# Patient Record
Sex: FEMALE | Race: WHITE | Employment: PART TIME | URBAN - METROPOLITAN AREA
[De-identification: names, ages, dates, MRNs, and addresses within clinical notes are randomized per-mention and may not be internally consistent; named-entity substitution may affect disease eponyms.]

---

## 2017-01-20 ENCOUNTER — OFFICE VISIT (OUTPATIENT)
Dept: FAMILY MEDICINE CLINIC | Age: 37
End: 2017-01-20

## 2017-01-20 VITALS
WEIGHT: 161 LBS | HEIGHT: 71 IN | BODY MASS INDEX: 22.54 KG/M2 | OXYGEN SATURATION: 98 % | DIASTOLIC BLOOD PRESSURE: 64 MMHG | HEART RATE: 74 BPM | SYSTOLIC BLOOD PRESSURE: 104 MMHG

## 2017-01-20 DIAGNOSIS — Z71.2 ENCOUNTER TO DISCUSS TEST RESULTS: Primary | ICD-10-CM

## 2017-01-20 PROCEDURE — 99395 PREV VISIT EST AGE 18-39: CPT | Performed by: FAMILY MEDICINE

## 2017-01-20 ASSESSMENT — ENCOUNTER SYMPTOMS
NAUSEA: 0
COLOR CHANGE: 0
ABDOMINAL PAIN: 0
EYE DISCHARGE: 0
SHORTNESS OF BREATH: 0
EYE REDNESS: 0

## 2017-08-23 ENCOUNTER — TELEPHONE (OUTPATIENT)
Dept: FAMILY MEDICINE CLINIC | Age: 37
End: 2017-08-23

## 2017-08-23 DIAGNOSIS — Z00.00 ANNUAL PHYSICAL EXAM: Primary | ICD-10-CM

## 2017-09-06 RX ORDER — LORATADINE 10 MG/1
10 TABLET ORAL DAILY
Qty: 30 TABLET | Refills: 4 | Status: SHIPPED | OUTPATIENT
Start: 2017-09-06 | End: 2018-07-04 | Stop reason: SDUPTHER

## 2017-11-01 DIAGNOSIS — Z00.00 ANNUAL PHYSICAL EXAM: ICD-10-CM

## 2017-11-01 LAB
A/G RATIO: 1.4 (ref 1.1–2.2)
ALBUMIN SERPL-MCNC: 4.1 G/DL (ref 3.4–5)
ALP BLD-CCNC: 60 U/L (ref 40–129)
ALT SERPL-CCNC: 8 U/L (ref 10–40)
ANION GAP SERPL CALCULATED.3IONS-SCNC: 12 MMOL/L (ref 3–16)
AST SERPL-CCNC: 14 U/L (ref 15–37)
BILIRUB SERPL-MCNC: 0.3 MG/DL (ref 0–1)
BUN BLDV-MCNC: 11 MG/DL (ref 7–20)
CALCIUM SERPL-MCNC: 9.2 MG/DL (ref 8.3–10.6)
CHLORIDE BLD-SCNC: 102 MMOL/L (ref 99–110)
CHOLESTEROL, TOTAL: 197 MG/DL (ref 0–199)
CO2: 26 MMOL/L (ref 21–32)
CREAT SERPL-MCNC: 0.5 MG/DL (ref 0.6–1.1)
GFR AFRICAN AMERICAN: >60
GFR NON-AFRICAN AMERICAN: >60
GLOBULIN: 3 G/DL
GLUCOSE BLD-MCNC: 92 MG/DL (ref 70–99)
HCT VFR BLD CALC: 38.5 % (ref 36–48)
HDLC SERPL-MCNC: 60 MG/DL (ref 40–60)
HEMOGLOBIN: 12.4 G/DL (ref 12–16)
LDL CHOLESTEROL CALCULATED: 125 MG/DL
MCH RBC QN AUTO: 30.9 PG (ref 26–34)
MCHC RBC AUTO-ENTMCNC: 32.2 G/DL (ref 31–36)
MCV RBC AUTO: 96.1 FL (ref 80–100)
PDW BLD-RTO: 12.9 % (ref 12.4–15.4)
PLATELET # BLD: 212 K/UL (ref 135–450)
PMV BLD AUTO: 9 FL (ref 5–10.5)
POTASSIUM SERPL-SCNC: 4.3 MMOL/L (ref 3.5–5.1)
RBC # BLD: 4.01 M/UL (ref 4–5.2)
SODIUM BLD-SCNC: 140 MMOL/L (ref 136–145)
TOTAL PROTEIN: 7.1 G/DL (ref 6.4–8.2)
TRIGL SERPL-MCNC: 60 MG/DL (ref 0–150)
VLDLC SERPL CALC-MCNC: 12 MG/DL
WBC # BLD: 4.8 K/UL (ref 4–11)

## 2017-11-08 ENCOUNTER — OFFICE VISIT (OUTPATIENT)
Dept: FAMILY MEDICINE CLINIC | Age: 37
End: 2017-11-08

## 2017-11-08 VITALS
SYSTOLIC BLOOD PRESSURE: 122 MMHG | BODY MASS INDEX: 22.26 KG/M2 | WEIGHT: 159 LBS | DIASTOLIC BLOOD PRESSURE: 78 MMHG | HEIGHT: 71 IN | OXYGEN SATURATION: 95 % | HEART RATE: 69 BPM

## 2017-11-08 DIAGNOSIS — Z01.419 ENCOUNTER FOR GYNECOLOGICAL EXAMINATION WITHOUT ABNORMAL FINDING: Primary | ICD-10-CM

## 2017-11-08 DIAGNOSIS — Z12.4 SCREENING FOR CERVICAL CANCER: ICD-10-CM

## 2017-11-08 PROCEDURE — 99395 PREV VISIT EST AGE 18-39: CPT | Performed by: FAMILY MEDICINE

## 2017-11-08 ASSESSMENT — ENCOUNTER SYMPTOMS
CONSTIPATION: 0
NAUSEA: 0
EYE REDNESS: 0
VOMITING: 0
SHORTNESS OF BREATH: 0
DIARRHEA: 0

## 2017-11-08 ASSESSMENT — PATIENT HEALTH QUESTIONNAIRE - PHQ9
1. LITTLE INTEREST OR PLEASURE IN DOING THINGS: 0
SUM OF ALL RESPONSES TO PHQ QUESTIONS 1-9: 0
SUM OF ALL RESPONSES TO PHQ9 QUESTIONS 1 & 2: 0
2. FEELING DOWN, DEPRESSED OR HOPELESS: 0

## 2017-11-08 NOTE — PROGRESS NOTES
Subjective:      Patient ID: Niko Lawrence is a 40 y.o. female. Here for pap no complaints  She does have a history of abnormal Pap smear. She has had a procedure for it. She usually gets Paps once a year. She had her lab drawn before today's visit here is the results  Notes Recorded by Cameron Mcdonough DO on 11/2/2017 at 7:56 PM EDT  Blood count good. Electrolytes, calcium, protein,  blood sugar, liver, and kidney function good, cholesterol is good.    Past Medical History:   Diagnosis Date    Cervical dysplasia     Endometriosis      Past Surgical History:   Procedure Laterality Date    LAPAROSCOPY      LEEP       Social History     Social History    Marital status:      Spouse name: N/A    Number of children: 1    Years of education: N/A     Occupational History    [employer]      special education     Social History Main Topics    Smoking status: Never Smoker    Smokeless tobacco: Never Used    Alcohol use Yes      Comment: Socially (beer)occasional    Drug use: No    Sexual activity: Not on file     Other Topics Concern    Not on file     Social History Narrative    No narrative on file       HPI    Review of Systems   Constitutional: Negative for unexpected weight change. HENT: Negative. Eyes: Negative for redness. Respiratory: Negative for shortness of breath. Cardiovascular: Negative for chest pain and leg swelling. Gastrointestinal: Negative for constipation, diarrhea, nausea and vomiting. Genitourinary: Negative for genital sores, menstrual problem, pelvic pain, vaginal bleeding, vaginal discharge and vaginal pain. Skin: Negative for pallor. Allergic/Immunologic: Negative for immunocompromised state. Psychiatric/Behavioral: Negative for confusion. All other systems reviewed and are negative. Objective:   Physical Exam   Constitutional: She is oriented to person, place, and time. She appears well-developed and well-nourished. No distress.    HENT:   Head: Normocephalic and atraumatic. Eyes: Conjunctivae are normal. No scleral icterus. Cardiovascular: Normal rate. Pulmonary/Chest: Effort normal. No stridor. Genitourinary: Vagina normal and uterus normal. No vaginal discharge found. Genitourinary Comments: Labia: no lesions  Vagina: no discharge or lesions  Cervix: nonfriable, no discharge, the cervix was  tilted with the cervix and uterus to the right and inserting the brush into the endocervical canal was difficult. Uterus and adnexa: no cervical motion tenderness, no adnexal fullness or tenderness  Rectum: normal     Musculoskeletal: She exhibits no edema. Neurological: She is alert and oriented to person, place, and time. Skin: Skin is warm. She is not diaphoretic. No erythema. Psychiatric: She has a normal mood and affect. Her behavior is normal. Judgment and thought content normal.       Assessment:      1. Encounter for gynecological examination without abnormal finding     2.  Screening for cervical cancer  PAP SMEAR           Plan:      Vashti was seen today for gynecologic exam.    Diagnoses and all orders for this visit:    Encounter for gynecological examination without abnormal finding  With a history of abnormal Pap smear  Screening for cervical cancer  -     PAP SMEAR   discussed labs, see results

## 2017-11-10 LAB
HPV COMMENT: NORMAL
HPV TYPE 16: NOT DETECTED
HPV TYPE 18: NOT DETECTED
HPVOH (OTHER TYPES): NOT DETECTED

## 2017-11-13 RX ORDER — FLUCONAZOLE 150 MG/1
150 TABLET ORAL ONCE
Qty: 1 TABLET | Refills: 0 | Status: SHIPPED | OUTPATIENT
Start: 2017-11-13 | End: 2017-11-13

## 2018-02-01 ENCOUNTER — PATIENT MESSAGE (OUTPATIENT)
Dept: FAMILY MEDICINE CLINIC | Age: 38
End: 2018-02-01

## 2018-02-01 RX ORDER — FLUTICASONE PROPIONATE 50 MCG
1 SPRAY, SUSPENSION (ML) NASAL DAILY
Qty: 1 BOTTLE | Refills: 3 | Status: SHIPPED | OUTPATIENT
Start: 2018-02-01 | End: 2019-06-14 | Stop reason: SDUPTHER

## 2018-07-05 RX ORDER — LORATADINE 10 MG/1
TABLET ORAL
Qty: 30 TABLET | Refills: 3 | Status: SHIPPED | OUTPATIENT
Start: 2018-07-05 | End: 2018-12-23 | Stop reason: SDUPTHER

## 2018-08-24 ENCOUNTER — E-VISIT (OUTPATIENT)
Dept: FAMILY MEDICINE CLINIC | Age: 38
End: 2018-08-24

## 2018-08-24 DIAGNOSIS — J01.80 OTHER ACUTE SINUSITIS, RECURRENCE NOT SPECIFIED: Primary | ICD-10-CM

## 2018-08-24 PROCEDURE — 99444 PR PHYSICIAN ONLINE EVALUATION & MANAGEMENT SERVICE: CPT | Performed by: FAMILY MEDICINE

## 2018-08-24 RX ORDER — SULFAMETHOXAZOLE AND TRIMETHOPRIM 800; 160 MG/1; MG/1
1 TABLET ORAL 2 TIMES DAILY
Qty: 20 TABLET | Refills: 0 | Status: SHIPPED | OUTPATIENT
Start: 2018-08-24 | End: 2018-09-03

## 2018-08-24 ASSESSMENT — LIFESTYLE VARIABLES: SMOKING_STATUS: NO

## 2018-12-26 RX ORDER — LORATADINE 10 MG/1
TABLET ORAL
Qty: 30 TABLET | Refills: 2 | Status: SHIPPED | OUTPATIENT
Start: 2018-12-26 | End: 2019-09-11 | Stop reason: ALTCHOICE

## 2019-01-14 ENCOUNTER — OFFICE VISIT (OUTPATIENT)
Dept: FAMILY MEDICINE CLINIC | Age: 39
End: 2019-01-14
Payer: OTHER GOVERNMENT

## 2019-01-14 VITALS
BODY MASS INDEX: 23.1 KG/M2 | OXYGEN SATURATION: 98 % | HEIGHT: 71 IN | TEMPERATURE: 98.5 F | RESPIRATION RATE: 16 BRPM | WEIGHT: 165 LBS | DIASTOLIC BLOOD PRESSURE: 64 MMHG | SYSTOLIC BLOOD PRESSURE: 90 MMHG | HEART RATE: 77 BPM

## 2019-01-14 DIAGNOSIS — J01.21 ACUTE RECURRENT ETHMOIDAL SINUSITIS: Primary | ICD-10-CM

## 2019-01-14 PROCEDURE — 99213 OFFICE O/P EST LOW 20 MIN: CPT | Performed by: NURSE PRACTITIONER

## 2019-01-14 RX ORDER — AZITHROMYCIN 250 MG/1
250 TABLET, FILM COATED ORAL SEE ADMIN INSTRUCTIONS
Qty: 6 TABLET | Refills: 0 | Status: SHIPPED | OUTPATIENT
Start: 2019-01-14 | End: 2019-01-19

## 2019-01-14 RX ORDER — HUMIDIFIER
1 EACH MISCELLANEOUS NIGHTLY
Qty: 1 EACH | Refills: 0 | Status: SHIPPED | OUTPATIENT
Start: 2019-01-14

## 2019-01-14 RX ORDER — CETIRIZINE HYDROCHLORIDE 10 MG/1
10 TABLET ORAL DAILY
Qty: 90 TABLET | Refills: 1 | Status: SHIPPED | OUTPATIENT
Start: 2019-01-14 | End: 2019-09-28 | Stop reason: SDUPTHER

## 2019-01-14 ASSESSMENT — PATIENT HEALTH QUESTIONNAIRE - PHQ9
SUM OF ALL RESPONSES TO PHQ QUESTIONS 1-9: 0
2. FEELING DOWN, DEPRESSED OR HOPELESS: 0
SUM OF ALL RESPONSES TO PHQ QUESTIONS 1-9: 0
1. LITTLE INTEREST OR PLEASURE IN DOING THINGS: 0
SUM OF ALL RESPONSES TO PHQ9 QUESTIONS 1 & 2: 0

## 2019-01-14 ASSESSMENT — ENCOUNTER SYMPTOMS
COUGH: 0
SINUS PAIN: 1
RESPIRATORY NEGATIVE: 1
RHINORRHEA: 1
SINUS PRESSURE: 1
HOARSE VOICE: 1

## 2019-06-14 RX ORDER — FLUTICASONE PROPIONATE 50 MCG
SPRAY, SUSPENSION (ML) NASAL
Qty: 1 BOTTLE | Refills: 2 | Status: SHIPPED | OUTPATIENT
Start: 2019-06-14

## 2019-09-11 ENCOUNTER — OFFICE VISIT (OUTPATIENT)
Dept: FAMILY MEDICINE CLINIC | Age: 39
End: 2019-09-11
Payer: OTHER GOVERNMENT

## 2019-09-11 VITALS
WEIGHT: 153 LBS | SYSTOLIC BLOOD PRESSURE: 94 MMHG | DIASTOLIC BLOOD PRESSURE: 72 MMHG | OXYGEN SATURATION: 98 % | BODY MASS INDEX: 21.42 KG/M2 | HEART RATE: 63 BPM | HEIGHT: 71 IN

## 2019-09-11 DIAGNOSIS — R53.83 TIREDNESS: ICD-10-CM

## 2019-09-11 DIAGNOSIS — Z83.49 FAMILY HISTORY OF THYROID DISEASE: ICD-10-CM

## 2019-09-11 DIAGNOSIS — Z87.42 HISTORY OF ENDOMETRIOSIS: ICD-10-CM

## 2019-09-11 DIAGNOSIS — Z00.00 ANNUAL PHYSICAL EXAM: Primary | ICD-10-CM

## 2019-09-11 DIAGNOSIS — Z00.00 ANNUAL PHYSICAL EXAM: ICD-10-CM

## 2019-09-11 DIAGNOSIS — N92.1 MENORRHAGIA WITH IRREGULAR CYCLE: ICD-10-CM

## 2019-09-11 LAB
A/G RATIO: 1.7 (ref 1.1–2.2)
ALBUMIN SERPL-MCNC: 4.7 G/DL (ref 3.4–5)
ALP BLD-CCNC: 61 U/L (ref 40–129)
ALT SERPL-CCNC: 7 U/L (ref 10–40)
ANION GAP SERPL CALCULATED.3IONS-SCNC: 12 MMOL/L (ref 3–16)
AST SERPL-CCNC: 12 U/L (ref 15–37)
BILIRUB SERPL-MCNC: 0.4 MG/DL (ref 0–1)
BUN BLDV-MCNC: 11 MG/DL (ref 7–20)
CALCIUM SERPL-MCNC: 9.3 MG/DL (ref 8.3–10.6)
CHLORIDE BLD-SCNC: 105 MMOL/L (ref 99–110)
CHOLESTEROL, TOTAL: 198 MG/DL (ref 0–199)
CO2: 26 MMOL/L (ref 21–32)
CREAT SERPL-MCNC: 0.6 MG/DL (ref 0.6–1.1)
GFR AFRICAN AMERICAN: >60
GFR NON-AFRICAN AMERICAN: >60
GLOBULIN: 2.8 G/DL
GLUCOSE BLD-MCNC: 98 MG/DL (ref 70–99)
HCT VFR BLD CALC: 38.7 % (ref 36–48)
HDLC SERPL-MCNC: 59 MG/DL (ref 40–60)
HEMOGLOBIN: 12.9 G/DL (ref 12–16)
LDL CHOLESTEROL CALCULATED: 124 MG/DL
MCH RBC QN AUTO: 31.7 PG (ref 26–34)
MCHC RBC AUTO-ENTMCNC: 33.4 G/DL (ref 31–36)
MCV RBC AUTO: 94.8 FL (ref 80–100)
PDW BLD-RTO: 13 % (ref 12.4–15.4)
PLATELET # BLD: 234 K/UL (ref 135–450)
PMV BLD AUTO: 9.1 FL (ref 5–10.5)
POTASSIUM SERPL-SCNC: 4.6 MMOL/L (ref 3.5–5.1)
RBC # BLD: 4.08 M/UL (ref 4–5.2)
SODIUM BLD-SCNC: 143 MMOL/L (ref 136–145)
TOTAL PROTEIN: 7.5 G/DL (ref 6.4–8.2)
TRIGL SERPL-MCNC: 77 MG/DL (ref 0–150)
TSH REFLEX FT4: 1.12 UIU/ML (ref 0.27–4.2)
VLDLC SERPL CALC-MCNC: 15 MG/DL
WBC # BLD: 4 K/UL (ref 4–11)

## 2019-09-11 PROCEDURE — 99395 PREV VISIT EST AGE 18-39: CPT | Performed by: FAMILY MEDICINE

## 2019-09-11 PROCEDURE — 90686 IIV4 VACC NO PRSV 0.5 ML IM: CPT | Performed by: FAMILY MEDICINE

## 2019-09-11 PROCEDURE — 90471 IMMUNIZATION ADMIN: CPT | Performed by: FAMILY MEDICINE

## 2019-09-11 ASSESSMENT — ENCOUNTER SYMPTOMS
NAUSEA: 0
ABDOMINAL PAIN: 0
COLOR CHANGE: 0
SHORTNESS OF BREATH: 0
EYE REDNESS: 0
EYE DISCHARGE: 0

## 2019-09-11 NOTE — PROGRESS NOTES
kg).    Physical Exam   Constitutional: She is oriented to person, place, and time. She appears well-developed and well-nourished. No distress. HENT:   Head: Normocephalic and atraumatic. Right Ear: External ear normal.   Left Ear: External ear normal.   Nose: Nose normal.   Mouth/Throat: Oropharynx is clear and moist.   Eyes: Pupils are equal, round, and reactive to light. Conjunctivae and EOM are normal. No scleral icterus. Neck: Normal range of motion. Neck supple. No thyromegaly present. Cardiovascular: Normal rate, regular rhythm and normal heart sounds. Pulmonary/Chest: Effort normal and breath sounds normal. No respiratory distress. She has no wheezes. Abdominal: Soft. Bowel sounds are normal. There is no tenderness. Musculoskeletal: Normal range of motion. She exhibits no edema or tenderness. Lymphadenopathy:     She has no cervical adenopathy. Neurological: She is alert and oriented to person, place, and time. Skin: Skin is warm and dry. No erythema. Psychiatric: She has a normal mood and affect. Her behavior is normal. Judgment and thought content normal.               The ASCVD Risk score (Luba Lewis, et al., 2013) failed to calculate for the following reasons:     The 2013 ASCVD risk score is only valid for ages 36 to 78    Immunization History   Administered Date(s) Administered    Influenza Vaccine, unspecified formulation 11/01/2012, 02/17/2018    Influenza Virus Vaccine 11/01/2012, 10/21/2014, 10/27/2015    Influenza, MDCK Quadv, IM, PF (Flucelvax 4 yrs and older) 02/17/2018    Influenza, Quadv, IM, PF (6 mo and older Fluzone, Flulaval, Fluarix, and 3 yrs and older Afluria) 11/01/2016    PPD Test 09/22/2014       Health Maintenance   Topic Date Due    Varicella Vaccine (1 of 2 - 13+ 2-dose series) 02/12/1993    HIV screen  02/12/1995    DTaP/Tdap/Td vaccine (1 - Tdap) 02/12/1999    Flu vaccine (1) 09/01/2019    Cervical cancer screen  11/08/2022    Pneumococcal 0-64

## 2019-10-01 RX ORDER — CETIRIZINE HYDROCHLORIDE 10 MG/1
10 TABLET ORAL DAILY
Qty: 90 TABLET | Refills: 1 | Status: SHIPPED | OUTPATIENT
Start: 2019-10-01 | End: 2020-12-23 | Stop reason: SDUPTHER

## 2019-10-01 RX ORDER — CETIRIZINE HYDROCHLORIDE 10 MG/1
TABLET ORAL
Qty: 90 TABLET | Refills: 0 | Status: SHIPPED | OUTPATIENT
Start: 2019-10-01 | End: 2019-10-01 | Stop reason: SDUPTHER

## 2019-10-30 ENCOUNTER — OFFICE VISIT (OUTPATIENT)
Dept: OBGYN CLINIC | Age: 39
End: 2019-10-30
Payer: OTHER GOVERNMENT

## 2019-10-30 VITALS
WEIGHT: 155.8 LBS | TEMPERATURE: 98.6 F | BODY MASS INDEX: 21.73 KG/M2 | HEART RATE: 63 BPM | DIASTOLIC BLOOD PRESSURE: 62 MMHG | SYSTOLIC BLOOD PRESSURE: 98 MMHG

## 2019-10-30 DIAGNOSIS — N93.9 ABNORMAL UTERINE BLEEDING (AUB): Primary | ICD-10-CM

## 2019-10-30 DIAGNOSIS — N93.9 ABNORMAL UTERINE BLEEDING: Primary | ICD-10-CM

## 2019-10-30 DIAGNOSIS — Z32.02 URINE PREGNANCY TEST NEGATIVE: ICD-10-CM

## 2019-10-30 DIAGNOSIS — N89.8 LEUKORRHEA: ICD-10-CM

## 2019-10-30 DIAGNOSIS — Z12.4 PAP SMEAR FOR CERVICAL CANCER SCREENING: ICD-10-CM

## 2019-10-30 LAB
BASOPHILS ABSOLUTE: 0 K/UL (ref 0–0.2)
BASOPHILS RELATIVE PERCENT: 1.1 %
CONTROL: NORMAL
EOSINOPHILS ABSOLUTE: 0.1 K/UL (ref 0–0.6)
EOSINOPHILS RELATIVE PERCENT: 3 %
HCT VFR BLD CALC: 40.4 % (ref 36–48)
HEMOGLOBIN: 13.6 G/DL (ref 12–16)
LYMPHOCYTES ABSOLUTE: 1.6 K/UL (ref 1–5.1)
LYMPHOCYTES RELATIVE PERCENT: 37.4 %
MCH RBC QN AUTO: 31.6 PG (ref 26–34)
MCHC RBC AUTO-ENTMCNC: 33.6 G/DL (ref 31–36)
MCV RBC AUTO: 94 FL (ref 80–100)
MONOCYTES ABSOLUTE: 0.4 K/UL (ref 0–1.3)
MONOCYTES RELATIVE PERCENT: 8.7 %
NEUTROPHILS ABSOLUTE: 2.1 K/UL (ref 1.7–7.7)
NEUTROPHILS RELATIVE PERCENT: 49.8 %
PDW BLD-RTO: 12.8 % (ref 12.4–15.4)
PLATELET # BLD: 233 K/UL (ref 135–450)
PMV BLD AUTO: 9.2 FL (ref 5–10.5)
PREGNANCY TEST URINE, POC: NEGATIVE
RBC # BLD: 4.29 M/UL (ref 4–5.2)
WBC # BLD: 4.2 K/UL (ref 4–11)

## 2019-10-30 PROCEDURE — 99203 OFFICE O/P NEW LOW 30 MIN: CPT | Performed by: OBSTETRICS & GYNECOLOGY

## 2019-10-30 PROCEDURE — 58100 BIOPSY OF UTERUS LINING: CPT | Performed by: OBSTETRICS & GYNECOLOGY

## 2019-10-30 PROCEDURE — 76856 US EXAM PELVIC COMPLETE: CPT | Performed by: OBSTETRICS & GYNECOLOGY

## 2019-10-30 PROCEDURE — 81025 URINE PREGNANCY TEST: CPT | Performed by: OBSTETRICS & GYNECOLOGY

## 2019-10-30 PROCEDURE — 36415 COLL VENOUS BLD VENIPUNCTURE: CPT | Performed by: OBSTETRICS & GYNECOLOGY

## 2019-10-31 LAB
CANDIDA SPECIES, DNA PROBE: NORMAL
FOLLICLE STIMULATING HORMONE: 3.8 MIU/ML
GARDNERELLA VAGINALIS, DNA PROBE: NORMAL
PROLACTIN: 9.9 NG/ML
TRICHOMONAS VAGINALIS DNA: NORMAL
TSH REFLEX: 1.55 UIU/ML (ref 0.27–4.2)

## 2019-11-04 ASSESSMENT — ENCOUNTER SYMPTOMS
SHORTNESS OF BREATH: 0
COUGH: 0
ABDOMINAL PAIN: 0
NAUSEA: 0
VOMITING: 0
DIARRHEA: 0
CONSTIPATION: 0
SORE THROAT: 0

## 2020-06-22 ENCOUNTER — E-VISIT (OUTPATIENT)
Dept: FAMILY MEDICINE CLINIC | Age: 40
End: 2020-06-22
Payer: OTHER GOVERNMENT

## 2020-06-22 PROCEDURE — 99421 OL DIG E/M SVC 5-10 MIN: CPT | Performed by: FAMILY MEDICINE

## 2020-10-27 ENCOUNTER — OFFICE VISIT (OUTPATIENT)
Dept: PRIMARY CARE CLINIC | Age: 40
End: 2020-10-27
Payer: OTHER GOVERNMENT

## 2020-10-27 DIAGNOSIS — Z11.59 SCREENING FOR VIRAL DISEASE: Primary | ICD-10-CM

## 2020-10-27 PROCEDURE — 99211 OFF/OP EST MAY X REQ PHY/QHP: CPT | Performed by: FAMILY MEDICINE

## 2020-10-27 NOTE — PATIENT INSTRUCTIONS

## 2020-10-27 NOTE — PROGRESS NOTES
Vashti Tierney received a viral test for COVID-19. They were educated on isolation and quarantine as appropriate. For any symptoms, they were directed to seek care from their PCP, given contact information to establish with a doctor, directed to an urgent care or the emergency room.

## 2020-10-29 LAB — SARS-COV-2, NAA: NOT DETECTED

## 2020-10-30 NOTE — RESULT ENCOUNTER NOTE

## 2020-12-23 RX ORDER — CETIRIZINE HYDROCHLORIDE 10 MG/1
10 TABLET ORAL DAILY
Qty: 90 TABLET | Refills: 3 | Status: SHIPPED | OUTPATIENT
Start: 2020-12-23 | End: 2022-01-14

## 2021-03-07 ENCOUNTER — NURSE TRIAGE (OUTPATIENT)
Dept: OTHER | Facility: CLINIC | Age: 41
End: 2021-03-07

## 2021-03-07 NOTE — TELEPHONE ENCOUNTER
Call received on [address]. Brief description of triage: Calls requesting a covid test.  Discussed and triaged symptoms of dry cough, body aches, headache, fatigue, runny nose starting 2 days ago. Triage indicates for patient to PCP in 24 hours. Instructed to call back tomorrow to PCP to request a virtual visit on Monday. Given the number for [facility] covid testing for [facility] patients [phone] to call and see if she qualifies for testing and/or provider orders. Care advice provided. Recommended using local department of health website for testing locations and up to date information. Caller verbalizes understanding, denies any other questions or concerns; instructed to call back for any new or worsening symptoms. Reason for Disposition   [1] Continuous (nonstop) coughing interferes with work or school AND [2] no improvement using cough treatment per protocol    Answer Assessment - Initial Assessment Questions  1. COVID-19 DIAGNOSIS: \"Who made your Coronavirus (COVID-19) diagnosis? \" \"Was it confirmed by a positive lab test?\" If not diagnosed by a HCP, ask \"Are there lots of cases (community spread) where you live? \" (See public health department website, if unsure)      No exposure    2. COVID-19 EXPOSURE: \"Was there any known exposure to COVID before the symptoms began? \" CDC Definition of close contact: within 6 feet (2 meters) for a total of 15 minutes or more over a 24-hour period. See above    3. ONSET: \"When did the COVID-19 symptoms start? \"       2 days ago    4. WORST SYMPTOM: \"What is your worst symptom? \" (e.g., cough, fever, shortness of breath, muscle aches)      Dry cough    5. COUGH: \"Do you have a cough? \" If so, ask: \"How bad is the cough? \"        Dry cough, interferes with sleep    6. FEVER: \"Do you have a fever? \" If so, ask: \"What is your temperature, how was it measured, and when did it start? \"      Feels feverish    7. RESPIRATORY STATUS: \"Describe your breathing? \" (e.g., shortness of breath, wheezing, unable to speak)       Denies    8. BETTER-SAME-WORSE: Norlene Pilar you getting better, staying the same or getting worse compared to yesterday? \"  If getting worse, ask, \"In what way? \"      Worse    9. HIGH RISK DISEASE: \"Do you have any chronic medical problems? \" (e.g., asthma, heart or lung disease, weak immune system, obesity, etc.)      Denies    10. PREGNANCY: \"Is there any chance you are pregnant? \" \"When was your last menstrual period? \"        Denies    11. OTHER SYMPTOMS: \"Do you have any other symptoms? \"  (e.g., chills, fatigue, headache, loss of smell or taste, muscle pain, sore throat; new loss of smell or taste especially support the diagnosis of COVID-19)        See travel screen. Denies chest pain or dizziness.     Protocols used: CORONAVIRUS (COVID-19) DIAGNOSED OR SUSPECTED-ADULT-

## 2021-03-08 ENCOUNTER — OFFICE VISIT (OUTPATIENT)
Dept: PRIMARY CARE CLINIC | Age: 41
End: 2021-03-08
Payer: OTHER GOVERNMENT

## 2021-03-08 DIAGNOSIS — Z11.59 SCREENING FOR VIRAL DISEASE: Primary | ICD-10-CM

## 2021-03-08 PROCEDURE — 99211 OFF/OP EST MAY X REQ PHY/QHP: CPT | Performed by: NURSE PRACTITIONER

## 2021-03-08 NOTE — PATIENT INSTRUCTIONS
Advance Care Planning  People with COVID-19 may have no symptoms, mild symptoms, such as fever, cough, and shortness of breath or they may have more severe illness, developing severe and fatal pneumonia. As a result, Advance Care Planning with attention to naming a health care decision maker (someone you trust to make healthcare decisions for you if you could not speak for yourself) and sharing other health care preferences is important BEFORE a possible health crisis. Please contact your Primary Care Provider to discuss Advance Care Planning. Preventing the Spread of Coronavirus Disease 2019 in Homes and Residential Communities  For the most recent information go to [email or web address]    Prevention steps for People with confirmed or suspected COVID-19 (including persons under investigation) who do not need to be hospitalized  and   People with confirmed COVID-19 who were hospitalized and determined to be medically stable to go home    Your healthcare provider and public health staff will evaluate whether you can be cared for at home. If it is determined that you do not need to be hospitalized and can be isolated at home, you will be monitored by staff from your local or state health department. You should follow the prevention steps below until a healthcare provider or local or state health department says you can return to your normal activities. Stay home except to get medical care  People who are mildly ill with COVID-19 are able to isolate at home during their illness. You should restrict activities outside your home, except for getting medical care. Do not go to work, school, or public areas. Avoid using public transportation, ride-sharing, or taxis. Separate yourself from other people and animals in your home  People: As much as possible, you should stay in a specific room and away from other people in your home.  Also, you should use a separate bathroom, if available. Animals: You should restrict contact with pets and other animals while you are sick with COVID-19, just like you would around other people. Although there have not been reports of pets or other animals becoming sick with COVID-19, it is still recommended that people sick with COVID-19 limit contact with animals until more information is known about the virus. When possible, have another member of your household care for your animals while you are sick. If you are sick with COVID-19, avoid contact with your pet, including petting, snuggling, being kissed or licked, and sharing food. If you must care for your pet or be around animals while you are sick, wash your hands before and after you interact with pets and wear a facemask. Call ahead before visiting your doctor  If you have a medical appointment, call the healthcare provider and tell them that you have or may have COVID-19. This will help the healthcare providers office take steps to keep other people from getting infected or exposed. Wear a facemask  You should wear a facemask when you are around other people (e.g., sharing a room or vehicle) or pets and before you enter a healthcare providers office. If you are not able to wear a facemask (for example, because it causes trouble breathing), then people who live with you should not stay in the same room with you, or they should wear a facemask if they enter your room. Cover your coughs and sneezes  Cover your mouth and nose with a tissue when you cough or sneeze. Throw used tissues in a lined trash can. Immediately wash your hands with soap and water for at least 20 seconds or, if soap and water are not available, clean your hands with an alcohol-based hand  that contains at least 60% alcohol.   Clean your hands often  Wash your hands often with soap and water for at least 20 seconds, especially after blowing your nose, coughing, or sneezing; going to the bathroom; and have a medical emergency and need to call 911, notify the dispatch personnel that you have, or are being evaluated for COVID-19. If possible, put on a facemask before emergency medical services arrive. Discontinuing home isolation  Patients with confirmed COVID-19 should remain under home isolation precautions until the risk of secondary transmission to others is thought to be low. The decision to discontinue home isolation precautions should be made on a case-by-case basis, in consultation with healthcare providers and state and local health departments.

## 2021-03-09 LAB — SARS-COV-2: NOT DETECTED

## 2021-08-20 ENCOUNTER — NURSE TRIAGE (OUTPATIENT)
Dept: OTHER | Facility: CLINIC | Age: 41
End: 2021-08-20

## 2021-08-20 NOTE — TELEPHONE ENCOUNTER
Received call from maude at [facility] with Red Flag Complaint. Brief description of triage: depression she has tried counseling and self care now ready for medication noted anxiety at times that is new for her     Triage indicates for patient to be seen in the next three days will attempt to get her in today     Care advice provided, patient verbalizes understanding; denies any other questions or concerns; instructed to call back for any new or worsening symptoms. Writer provided warm transfer to rain at [facility] for appointment scheduling. Attention Provider: Thank you for allowing me to participate in the care of your patient. The patient was connected to triage in response to information provided to the [facility]. Please do not respond through this encounter as the response is not directed to a shared pool. Reason for Disposition   Symptoms interfere with work or school    Answer Assessment - Initial Assessment Questions  1. CONCERN: \"What happened that made you call today? \"      Wanted to schedule annual physical with MD she wants to talk about depression     2. DEPRESSION SYMPTOM SCREENING: \"How are you feeling overall? \" (e.g., decreased energy, increased sleeping or difficulty sleeping, difficulty concentrating, feelings of sadness, guilt, hopelessness, or worthlessness)      Decreased energy sleeping a lot and does not feel like doing anything and it is hard to keep her happy     3. RISK OF HARM - SUICIDAL IDEATION:  \"Do you ever have thoughts of hurting or killing yourself? \"  (e.g., yes, no, no but preoccupation with thoughts about death)    - INTENT:  \"Do you have thoughts of hurting or killing yourself right NOW? \" (e.g., yes, no, N/A)    - PLAN: \"Do you have a specific plan for how you would do this? \" (e.g., gun, knife, overdose, no plan, N/A)      None     4. RISK OF HARM - HOMICIDAL IDEATION:  \"Do you ever have thoughts of hurting or killing someone else? \"  (e.g.,

## 2021-08-23 ENCOUNTER — OFFICE VISIT (OUTPATIENT)
Dept: FAMILY MEDICINE CLINIC | Age: 41
End: 2021-08-23
Payer: OTHER GOVERNMENT

## 2021-08-23 VITALS
HEART RATE: 70 BPM | RESPIRATION RATE: 16 BRPM | HEIGHT: 71 IN | BODY MASS INDEX: 21.98 KG/M2 | SYSTOLIC BLOOD PRESSURE: 110 MMHG | WEIGHT: 157 LBS | DIASTOLIC BLOOD PRESSURE: 70 MMHG | OXYGEN SATURATION: 97 % | TEMPERATURE: 97.8 F

## 2021-08-23 DIAGNOSIS — F32.A ANXIETY AND DEPRESSION: Primary | ICD-10-CM

## 2021-08-23 DIAGNOSIS — F41.9 ANXIETY AND DEPRESSION: Primary | ICD-10-CM

## 2021-08-23 DIAGNOSIS — Z13.31 POSITIVE DEPRESSION SCREENING: ICD-10-CM

## 2021-08-23 PROCEDURE — 99214 OFFICE O/P EST MOD 30 MIN: CPT | Performed by: NURSE PRACTITIONER

## 2021-08-23 PROCEDURE — G8431 POS CLIN DEPRES SCRN F/U DOC: HCPCS | Performed by: NURSE PRACTITIONER

## 2021-08-23 RX ORDER — PAROXETINE 10 MG/1
10 TABLET, FILM COATED ORAL DAILY
Qty: 30 TABLET | Refills: 3 | Status: SHIPPED | OUTPATIENT
Start: 2021-08-23 | End: 2022-06-17

## 2021-08-23 SDOH — ECONOMIC STABILITY: FOOD INSECURITY: WITHIN THE PAST 12 MONTHS, THE FOOD YOU BOUGHT JUST DIDN'T LAST AND YOU DIDN'T HAVE MONEY TO GET MORE.: NEVER TRUE

## 2021-08-23 SDOH — ECONOMIC STABILITY: FOOD INSECURITY: WITHIN THE PAST 12 MONTHS, YOU WORRIED THAT YOUR FOOD WOULD RUN OUT BEFORE YOU GOT MONEY TO BUY MORE.: NEVER TRUE

## 2021-08-23 ASSESSMENT — ENCOUNTER SYMPTOMS
RESPIRATORY NEGATIVE: 1
GASTROINTESTINAL NEGATIVE: 1

## 2021-08-23 ASSESSMENT — PATIENT HEALTH QUESTIONNAIRE - PHQ9
SUM OF ALL RESPONSES TO PHQ QUESTIONS 1-9: 10
6. FEELING BAD ABOUT YOURSELF - OR THAT YOU ARE A FAILURE OR HAVE LET YOURSELF OR YOUR FAMILY DOWN: 1
1. LITTLE INTEREST OR PLEASURE IN DOING THINGS: 3
9. THOUGHTS THAT YOU WOULD BE BETTER OFF DEAD, OR OF HURTING YOURSELF: 0
SUM OF ALL RESPONSES TO PHQ QUESTIONS 1-9: 10
3. TROUBLE FALLING OR STAYING ASLEEP: 2
5. POOR APPETITE OR OVEREATING: 0
SUM OF ALL RESPONSES TO PHQ9 QUESTIONS 1 & 2: 5
2. FEELING DOWN, DEPRESSED OR HOPELESS: 2
8. MOVING OR SPEAKING SO SLOWLY THAT OTHER PEOPLE COULD HAVE NOTICED. OR THE OPPOSITE, BEING SO FIGETY OR RESTLESS THAT YOU HAVE BEEN MOVING AROUND A LOT MORE THAN USUAL: 0
7. TROUBLE CONCENTRATING ON THINGS, SUCH AS READING THE NEWSPAPER OR WATCHING TELEVISION: 0
SUM OF ALL RESPONSES TO PHQ QUESTIONS 1-9: 10
10. IF YOU CHECKED OFF ANY PROBLEMS, HOW DIFFICULT HAVE THESE PROBLEMS MADE IT FOR YOU TO DO YOUR WORK, TAKE CARE OF THINGS AT HOME, OR GET ALONG WITH OTHER PEOPLE: 1
4. FEELING TIRED OR HAVING LITTLE ENERGY: 2

## 2021-08-23 ASSESSMENT — COLUMBIA-SUICIDE SEVERITY RATING SCALE - C-SSRS
6. HAVE YOU EVER DONE ANYTHING, STARTED TO DO ANYTHING, OR PREPARED TO DO ANYTHING TO END YOUR LIFE?: NO
1. WITHIN THE PAST MONTH, HAVE YOU WISHED YOU WERE DEAD OR WISHED YOU COULD GO TO SLEEP AND NOT WAKE UP?: NO
2. HAVE YOU ACTUALLY HAD ANY THOUGHTS OF KILLING YOURSELF?: NO

## 2021-08-23 ASSESSMENT — SOCIAL DETERMINANTS OF HEALTH (SDOH): HOW HARD IS IT FOR YOU TO PAY FOR THE VERY BASICS LIKE FOOD, HOUSING, MEDICAL CARE, AND HEATING?: NOT HARD AT ALL

## 2021-08-23 NOTE — PATIENT INSTRUCTIONS
Patient Education        paroxetine  Pronunciation:  patience ROSAS a teen  Brand:  Brisdelle, Paxil, Paxil CR, Ashokitzel Miguelgi  What is the most important information I should know about paroxetine? You should not use paroxetine if you are also taking pimozide or thioridazine. Do not use paroxetine within 14 days before or 14 days after you have used an MAO inhibitor, such as isocarboxazid, linezolid, methylene blue injection, phenelzine, rasagiline, selegiline, or tranylcypromine. Some young people have thoughts about suicide when first taking an antidepressant. Stay alert to changes in your mood or symptoms. Report any new or worsening symptoms to your doctor  Seek medical attention right away if you have symptoms such as: agitation, hallucinations, muscle stiffness, twitching, loss of coordination, dizziness, warmth or tingly feeling, nausea, vomiting, diarrhea, fever, sweating, tremors, racing heartbeats, or a seizure (convulsions). What is paroxetine? Paroxetine is a selective serotonin reuptake inhibitor (SSRI) antidepressant. Paroxetine is used to treat depression, including major depressive disorder. Paroxtine is also used to treat panic disorder, obsessive-compulsive disorder (OCD), anxiety disorders, post-traumatic stress disorder (PTSD), and premenstrual dysphoric disorder (PMDD). The Brisdelle brand of paroxetine is used to treat hot flashes related to menopause. Tatianaa Aleman is not for treating any other conditions. Paroxetine may also be used for purposes not listed in this medication guide. What should I discuss with my healthcare provider before taking paroxetine? You should not use this medicine if you are allergic to paroxetine, or if you are also taking pimozide or thioridazine. Do not use an MAO inhibitor within 14 days before or 14 days after you take paroxetine. A dangerous drug interaction could occur.  MAO inhibitors include isocarboxazid, linezolid, phenelzine, rasagiline, selegiline, and tranylcypromine. After you stop taking paroxetine you must wait at least 14 days before you start taking an MAO inhibitor. Tell your doctor if you have ever had:  · heart disease, high blood pressure, or a stroke;  · liver or kidney disease;  · a bleeding or blood clotting disorder;  · seizures or epilepsy;  · bipolar disorder (manic depression), drug addiction, or suicidal thoughts;  · narrow-angle glaucoma; or  · low levels of sodium in your blood. Be sure your doctor knows if you also take stimulant medicine, opioid medicine, herbal products, or medicine for depression, mental illness, Parkinson's disease, migraine headaches, serious infections, or prevention of nausea and vomiting. These medicines may interact with paroxetine and cause a serious condition called serotonin syndrome. Some young people have thoughts about suicide when first taking an antidepressant. Your doctor should check your progress at regular visits. Your family or other caregivers should also be alert to changes in your mood or symptoms. Taking an SSRI antidepressant during pregnancy may cause serious lung problems or other complications in the baby. However, you may have a relapse of depression if you stop taking your antidepressant. Tell your doctor right away if you become pregnant. Do not start or stop taking this medicine without your doctor's advice. Do not use Brisdelle if you are pregnant. You should not breastfeed while using this medicine. Paroxetine is not approved for use by anyone younger than 25years old. How should I take paroxetine? Follow all directions on your prescription label and read all medication guides or instruction sheets. Your doctor may occasionally change your dose. Use the medicine exactly as directed. Swallow the extended-release tablet whole and do not crush, chew, or break it. Shake the oral suspension (liquid) before you measure a dose.  Use the dosing syringe provided, or use a medicine dose-measuring device (not a kitchen spoon). It may take up to 4 weeks before your symptoms improve. Keep using the medication as directed and tell your doctor if your symptoms do not improve. Do not stop using paroxetine suddenly, or you could have unpleasant withdrawal symptoms. Ask your doctor how to safely stop using paroxetine. Follow your doctor's instructions about tapering your dose. Store at room temperature away from moisture, heat, and light. What happens if I miss a dose? Take the medicine as soon as you can, but skip the missed dose if it is almost time for your next dose. Do not take two doses at one time. What happens if I overdose? Seek emergency medical attention or call the Poison Help line at 1-167.583.5535. An overdose of paroxetine can be fatal.  What should I avoid while taking paroxetine? Avoid driving or hazardous activity until you know how this medicine will affect you. Your reactions could be impaired. Ask your doctor before taking a nonsteroidal anti-inflammatory drug (NSAID) such as aspirin, ibuprofen (Advil, Motrin), naproxen (Aleve), celecoxib (Celebrex), diclofenac, indomethacin, meloxicam, and others. Using an NSAID with paroxetine may cause you to bruise or bleed easily. Drinking alcohol with this medicine can cause side effects. What are the possible side effects of paroxetine? Get emergency medical help if you have signs of an allergic reaction (hives, difficult breathing, swelling in your face or throat) or a severe skin reaction (fever, sore throat, burning eyes, skin pain, red or purple skin rash with blistering and peeling). Report any new or worsening symptoms to your doctor, such as: mood or behavior changes, anxiety, panic attacks, trouble sleeping, or if you feel impulsive, irritable, agitated, hostile, aggressive, restless, hyperactive (mentally or physically), more depressed, or have thoughts about suicide or hurting yourself.   Call your doctor at once if you have:  · racing thoughts, decreased need for sleep, unusual risk-taking behavior, feelings of extreme happiness or sadness, being more talkative than usual;  · blurred vision, tunnel vision, eye pain or swelling, or seeing halos around lights;  · unusual bone pain or tenderness, swelling or bruising;  · changes in weight or appetite;  · easy bruising, unusual bleeding (nose, mouth, vagina, or rectum), coughing up blood;  · severe nervous system reaction --very stiff (rigid) muscles, high fever, sweating, confusion, fast or uneven heartbeats, tremors, fainting; or  · low levels of sodium in the body --headache, confusion, slurred speech, severe weakness, loss of coordination, feeling unsteady. Seek medical attention right away if you have symptoms of serotonin syndrome, such as: agitation, hallucinations, fever, sweating, shivering, fast heart rate, muscle stiffness, twitching, loss of coordination, nausea, vomiting, or diarrhea. Common side effects may include:  · vision changes;  · weakness, drowsiness, dizziness, tiredness;  · sweating, anxiety, shaking;  · sleep problems (insomnia);  · loss of appetite, nausea, vomiting, diarrhea, constipation;  · dry mouth, yawning;  · infection;  · headache; or  · decreased sex drive, impotence, abnormal ejaculation, or difficulty having an orgasm. This is not a complete list of side effects and others may occur. Call your doctor for medical advice about side effects. You may report side effects to FDA at 1-529-FDA-6943. What other drugs will affect paroxetine? Using paroxetine with other drugs that make you drowsy can worsen this effect. Ask your doctor before using opioid medication, a sleeping pill, a muscle relaxer, or medicine for anxiety or seizures.   Tell your doctor about all your other medicines, especially:  · cimetidine (Tagamet), digoxin, Vance's wort, tamoxifen, theophylline, tryptophan (sometimes called L-tryptophan), warfarin (Coumadin, Bela Rodriguez);  · a diuretic or \"water pill\";  · heart rhythm medicine;  · HIV or AIDS medications;  · certain medicines to treat narcolepsy or ADHD --amphetamine, atomoxetine, dextroamphetamine, Adderall, Dexedrine, Evekeo, Vyvanse, and others;  · narcotic pain medicine --fentanyl, tramadol;  · medicine to treat anxiety, mood disorders, thought disorders, or mental illness --such as buspirone, lithium, other antidepressants, or antipsychotics;  · migraine headache medicine --sumatriptan, rizatriptan, zolmitriptan, and others; or  · seizure medicine --phenobarbital, phenytoin. This list is not complete. Other drugs may interact with paroxetine, including prescription and over-the-counter medicines, vitamins, and herbal products. Not all possible interactions are listed in this medication guide. Where can I get more information? Your pharmacist can provide more information about paroxetine. Remember, keep this and all other medicines out of the reach of children, never share your medicines with others, and use this medication only for the indication prescribed. Every effort has been made to ensure that the information provided by [address] [place] [address]  is accurate, up-to-date, and complete, but no guarantee is made to that effect. Drug information contained herein may be time sensitive. [facility] information has been compiled for use by healthcare practitioners and consumers in the [facility] and therefore [facility] does not warrant that uses outside of the [facility] are appropriate, unless specifically indicated otherwise. [facility]'s drug information does not endorse drugs, diagnose patients or recommend therapy.  [facility] drug information is an informational resource designed to assist licensed healthcare practitioners in caring for their patients and/or to serve consumers viewing this service as a supplement to, and not a substitute for, the expertise, skill, knowledge and judgment of healthcare put off important decisions until your depression has lifted. If you have plans that will have a major impact on your life, such as marriage, divorce, or a job change, try to wait a bit. Talk it over with friends and loved ones who can help you look at the overall picture first.  · Reaching out to people for help is important. Do not isolate yourself. Let your family and friends help you. Find someone you can trust and confide in, and talk to that person. · Be patient, and be kind to yourself. Remember that depression is not your fault and is not something you can overcome with willpower alone. Treatment is important for depression, just like for any other illness. Feeling better takes time, and your mood will improve little by little. Stay active  · Stay busy and get outside. Take a walk, or try some other light exercise. · Talk with your doctor about an exercise program. Exercise can help with mild depression. · Go to a movie or concert. Take part in a Worship activity or other social gathering. Go to a ball game. · Ask a friend to have dinner with you. Take care of yourself  · Eat a balanced diet with plenty of fresh fruits and vegetables, whole grains, and lean protein. If you have lost your appetite, eat small snacks rather than large meals. · Avoid using illegal drugs or marijuana and drinking alcohol. Do not take medicines that have not been prescribed for you. They may interfere with medicines you may be taking for depression, or they may make your depression worse. · Take your medicines exactly as they are prescribed. You may start to feel better within 1 to 3 weeks of taking antidepressant medicine. But it can take as many as 6 to 8 weeks to see more improvement. If you have questions or concerns about your medicines, or if you do not notice any improvement by 3 weeks, talk to your doctor. · Continue to take your medicine after your symptoms improve.  Taking your medicine for at least 6 months after you feel better can help keep you from getting depressed again. If this isn't the first time you have been depressed, your doctor may recommend you to take medicine even longer. · If you have any side effects from your medicine, tell your doctor. Many side effects are mild and will go away on their own after you have been taking the medicine for a few weeks. Some may last longer. Talk to your doctor if side effects are bothering you too much. You might be able to try a different medicine. · Continue counseling. It may help prevent depression from returning, especially if you've had multiple episodes of depression. Talk with your counselor if you are having a hard time attending your sessions or you think the sessions aren't working. Don't just stop going. · Get enough sleep. Talk to your doctor if you are having problems sleeping. · Avoid sleeping pills unless they are prescribed by the doctor treating your depression. Sleeping pills may make you groggy during the day, and they may interact with other medicine you are taking. · If you have any other illnesses, such as diabetes, heart disease, or high blood pressure, make sure to continue with your treatment. Tell your doctor about all of the medicines you take, including those with or without a prescription. · If you or someone you know talks about suicide, self-harm, or feeling hopeless, get help right away. Call the [address] [place] [address] at 1-800-273-talk (0-967.897.5164) or text HOME to 074232 to access the Crisis Text Line. Consider saving these numbers in your phone. When should you call for help? Call 204 anytime you think you may need emergency care. For example, call if:    · You feel like hurting yourself or someone else.     · Someone you know has depression and is about to attempt or is attempting suicide.    Call your doctor now or seek immediate medical care if:    · You hear voices.     · Someone you know has depression and:  ? Starts to give away his or her possessions. ? Uses illegal drugs or drinks alcohol heavily. ? Talks or writes about death, including writing suicide notes or talking about guns, knives, or pills. ? Starts to spend a lot of time alone. ? Acts very aggressively or suddenly appears calm. Watch closely for changes in your health, and be sure to contact your doctor if:    · You do not get better as expected. Where can you learn more? Go to https://[facility] Software.MynewMD. org and sign in to your [employer] account. Enter I499 in the Moov cc. box to learn more about \"Recovering From Depression: Care Instructions. \"     If you do not have an account, please click on the \"Sign Up Now\" link. Current as of: September 23, 2020               Content Version: 12.9  © 8485-1736 [employer]. Care instructions adapted under license by [facility] ([place] [facility]). If you have questions about a medical condition or this instruction, always ask your healthcare professional. [place] [address] any warranty or liability for your use of this information. Patient Education        Depression Treatment: Care Instructions  Your Care Instructions     Depression is a condition that affects the way you feel, think, and act. It causes symptoms such as low energy, loss of interest in daily activities, and sadness or grouchiness that goes on for a long time. Depression is very common and affects men and women of all ages. Depression is a medical illness caused by changes in the natural chemicals in your brain. It is not a character flaw, and it does not mean that you are a bad or weak person. It does not mean that you are going crazy. It is important to know that depression can be treated. Medicines, counseling, and self-care can all help. Many people do not get help because they are embarrassed or think that they will get over the depression on their own.  But some people do not get better without treatment. Follow-up care is a key part of your treatment and safety. Be sure to make and go to all appointments, and call your doctor if you are having problems. It's also a good idea to know your test results and keep a list of the medicines you take. How can you care for yourself at home? Learn about antidepressant medicines  Antidepressant medicines can improve or end the symptoms of depression. You may need to take the medicine for at least 6 months, and often longer. Keep taking your medicine even if you feel better. If you stop taking it too soon, your symptoms may come back or get worse. You may start to feel better within 1 to 3 weeks of taking antidepressant medicine. But it can take as many as 6 to 8 weeks to see more improvement. Talk to your doctor if you have problems with your medicine or if you do not notice any improvement after 3 weeks. Antidepressants can make you feel tired, dizzy, or nervous. Some people have dry mouth, constipation, headaches, sexual problems, an upset stomach, or diarrhea. Many of these side effects are mild and go away on their own after you take the medicine for a few weeks. Some may last longer. Talk to your doctor if side effects bother you too much. You might be able to try a different medicine. If you are pregnant or breastfeeding, talk to your doctor about what medicines you can take. Learn about counseling  In many cases, counseling can work as well as medicines to treat mild to moderate depression. Counseling is done by licensed mental health providers, such as psychologists, social workers, and some types of nurses. It can be done in one-on-one sessions or in a group setting. Many people find group sessions helpful. Cognitive-behavioral therapy is a type of counseling. In this treatment, you learn how to see and change unhelpful thinking styles that may be adding to your depression. Counseling and medicines often work well when used together.   When should you call for help? Call 911 anytime you think you may need emergency care. For example, call if:    · You feel you cannot stop from hurting yourself or someone else. Call your doctor now or seek immediate medical care if:    · You hear voices.     · You feel much more depressed. Watch closely for changes in your health, and be sure to contact your doctor if:    · You are having problems with your depression medicine.     · You are not getting better as expected. Where can you learn more? Go to https://Ammado.[employer]vie [employer] Fraises. org and sign in to your 911 View account. Enter N306 in the Effdon box to learn more about \"Depression Treatment: Care Instructions. \"     If you do not have an account, please click on the \"Sign Up Now\" link. Current as of: September 23, 2020               Content Version: 12.9  © 5039-2419 Health[employer]orporated. Care instructions adapted under license by [facility] [address] If you have questions about a medical condition or this instruction, always ask your healthcare professional. [address] any warranty or liability for your use of this information.

## 2021-08-23 NOTE — PROGRESS NOTES
8/23/2021    This is a 39 y.o. female   Chief Complaint   Patient presents with    Depression     depression for approx one year; discuss medication options   . Vashti is seen today for evaluation of long standing depression that is progressively been worsening. She has anxiety and depression regarding family stressors around her son and social situations. She feels down and wants to sleep all the time. They just built a house and she does not have motivation to decorate the house or even clean. On her days off she just wants to sleep, but she does not sleep well. She denies suicidal/homicidal ideation. She was previously seeing a counselor and would find seeing a counselor helpful. She is interested in medication to help with her symptoms. Patient Active Problem List   Diagnosis   (none) - all problems resolved or deleted       Current Outpatient Medications   Medication Sig Dispense Refill    cetirizine (ZYRTEC) 10 MG tablet Take 1 tablet by mouth daily 90 tablet 3    fluticasone (FLONASE) 50 MCG/ACT nasal spray SPRAY ONE SPRAY IN EACH NOSTRIL ONCE DAILY 1 Bottle 2    Humidifiers (VICKS MINI COOLMIST HUMIDIFIER) MISC 1 Device by Does not apply route nightly 1 each 0     No current facility-administered medications for this visit. No Known Allergies    There were no vitals taken for this visit. Social History     Tobacco Use    Smoking status: Never Smoker    Smokeless tobacco: Never Used   Substance Use Topics    Alcohol use: Yes     Comment: Socially (beer)occasional       Review of Systems   Constitutional: Positive for fatigue. Negative for activity change, appetite change and unexpected weight change. Respiratory: Negative. Cardiovascular: Negative. Gastrointestinal: Negative. Psychiatric/Behavioral: Positive for agitation (get irritated easily), dysphoric mood and sleep disturbance. Negative for behavioral problems, self-injury and suicidal ideas.  The patient is nervous/anxious. Physical Exam  Vitals and nursing note reviewed. Constitutional:       General: She is not in acute distress. Appearance: Normal appearance. She is normal weight. She is not ill-appearing. HENT:      Head: Normocephalic and atraumatic. Right Ear: External ear normal.      Left Ear: External ear normal.      Nose: Nose normal. No rhinorrhea. Mouth/Throat:      Pharynx: Oropharynx is clear. Eyes:      General:         Right eye: No discharge. Left eye: No discharge. Conjunctiva/sclera: Conjunctivae normal.   Neck:      Trachea: Phonation normal.   Cardiovascular:      Rate and Rhythm: Normal rate. Pulmonary:      Effort: Pulmonary effort is normal. No respiratory distress. Breath sounds: Normal breath sounds. Abdominal:      General: Abdomen is flat. Musculoskeletal:      Cervical back: Normal range of motion. Skin:     Coloration: Skin is not jaundiced or pale. Neurological:      General: No focal deficit present. Mental Status: She is alert and oriented to person, place, and time. Psychiatric:         Attention and Perception: Attention and perception normal.         Mood and Affect: Mood is depressed. Affect is tearful. Speech: Speech normal. Speech is not rapid and pressured or tangential.         Behavior: Behavior normal. Behavior is cooperative. Thought Content: Thought content normal. Thought content is not paranoid. Thought content does not include homicidal or suicidal ideation. Cognition and Memory: Cognition normal.         Judgment: Judgment normal.         Diagnosis       ICD-10-CM    1. Anxiety and depression  F41.9     F32.9    2.  Positive depression screening  Z13.31 Positive Screen for Clinical Depression with a Documented Follow-up Plan         Plan    Encouraged to schedule with counselor  Continue self care stratagies  Start paxil  Follow up in 1 month with Dr Ana Osborn This Encounter   Procedures    Positive Screen for Clinical Depression with a Documented Follow-up Plan        Orders Placed This Encounter   Medications    PARoxetine (PAXIL) 10 MG tablet     Sig: Take 1 tablet by mouth daily     Dispense:  30 tablet     Refill:  3       Patient Education:  Paxil, self care    Return in about 4 weeks (around 9/20/2021) for Mental health Dr Jackson Troncoso.

## 2021-09-01 ENCOUNTER — OFFICE VISIT (OUTPATIENT)
Dept: FAMILY MEDICINE CLINIC | Age: 41
End: 2021-09-01
Payer: OTHER GOVERNMENT

## 2021-09-01 VITALS
HEART RATE: 71 BPM | HEIGHT: 71 IN | DIASTOLIC BLOOD PRESSURE: 68 MMHG | WEIGHT: 154 LBS | BODY MASS INDEX: 21.56 KG/M2 | SYSTOLIC BLOOD PRESSURE: 100 MMHG | OXYGEN SATURATION: 98 %

## 2021-09-01 DIAGNOSIS — L98.9 SKIN LESION: ICD-10-CM

## 2021-09-01 DIAGNOSIS — R53.83 FATIGUE, UNSPECIFIED TYPE: ICD-10-CM

## 2021-09-01 DIAGNOSIS — Z00.00 ROUTINE GENERAL MEDICAL EXAMINATION AT A HEALTH CARE FACILITY: Primary | ICD-10-CM

## 2021-09-01 LAB
A/G RATIO: 1.6 (ref 1.1–2.2)
ALBUMIN SERPL-MCNC: 4.5 G/DL (ref 3.4–5)
ALP BLD-CCNC: 67 U/L (ref 40–129)
ALT SERPL-CCNC: 8 U/L (ref 10–40)
ANION GAP SERPL CALCULATED.3IONS-SCNC: 12 MMOL/L (ref 3–16)
AST SERPL-CCNC: 12 U/L (ref 15–37)
BILIRUB SERPL-MCNC: 0.3 MG/DL (ref 0–1)
BUN BLDV-MCNC: 10 MG/DL (ref 7–20)
CALCIUM SERPL-MCNC: 9.5 MG/DL (ref 8.3–10.6)
CHLORIDE BLD-SCNC: 103 MMOL/L (ref 99–110)
CO2: 26 MMOL/L (ref 21–32)
CREAT SERPL-MCNC: 0.6 MG/DL (ref 0.6–1.1)
FOLATE: 12.6 NG/ML (ref 4.78–24.2)
GFR AFRICAN AMERICAN: >60
GFR NON-AFRICAN AMERICAN: >60
GLOBULIN: 2.8 G/DL
GLUCOSE BLD-MCNC: 100 MG/DL (ref 70–99)
HCT VFR BLD CALC: 37.9 % (ref 36–48)
HEMOGLOBIN: 13.1 G/DL (ref 12–16)
IRON SATURATION: 20 % (ref 15–50)
IRON: 62 UG/DL (ref 37–145)
MCH RBC QN AUTO: 32.1 PG (ref 26–34)
MCHC RBC AUTO-ENTMCNC: 34.6 G/DL (ref 31–36)
MCV RBC AUTO: 92.8 FL (ref 80–100)
PDW BLD-RTO: 12.8 % (ref 12.4–15.4)
PLATELET # BLD: 235 K/UL (ref 135–450)
PMV BLD AUTO: 8.6 FL (ref 5–10.5)
POTASSIUM SERPL-SCNC: 4.3 MMOL/L (ref 3.5–5.1)
RBC # BLD: 4.09 M/UL (ref 4–5.2)
SODIUM BLD-SCNC: 141 MMOL/L (ref 136–145)
TOTAL IRON BINDING CAPACITY: 307 UG/DL (ref 260–445)
TOTAL PROTEIN: 7.3 G/DL (ref 6.4–8.2)
TSH SERPL DL<=0.05 MIU/L-ACNC: 1.08 UIU/ML (ref 0.27–4.2)
VITAMIN B-12: 281 PG/ML (ref 211–911)
VITAMIN D 25-HYDROXY: 32.3 NG/ML
WBC # BLD: 3.4 K/UL (ref 4–11)

## 2021-09-01 PROCEDURE — 99396 PREV VISIT EST AGE 40-64: CPT | Performed by: NURSE PRACTITIONER

## 2021-09-01 PROCEDURE — 36415 COLL VENOUS BLD VENIPUNCTURE: CPT | Performed by: NURSE PRACTITIONER

## 2021-09-01 ASSESSMENT — ENCOUNTER SYMPTOMS
SHORTNESS OF BREATH: 0
COUGH: 0
NAUSEA: 0
VOMITING: 0
DIARRHEA: 0

## 2021-09-01 NOTE — PROGRESS NOTES
2021    Chief Complaint   Patient presents with    Annual Exam     Socrates Radford (:  1980) is a 39 y.o. female, here for a preventive medicine evaluation. Skin lesion- left upper quadrant, noticed a week ago. Dark in color. Thinks possible blood blister. Did not start Paxil for depression and anxiety    Complains of chronic fatigue- wants vitamin and iron levels checked    Patient Active Problem List   Diagnosis   (none) - all problems resolved or deleted       Review of Systems   Constitutional: Negative for chills, fatigue and fever. Respiratory: Negative for cough and shortness of breath. Cardiovascular: Negative for chest pain and leg swelling. Gastrointestinal: Negative for diarrhea, nausea and vomiting. Neurological: Negative for dizziness and headaches. Prior to Visit Medications    Medication Sig Taking?  Authorizing Provider   PARoxetine (PAXIL) 10 MG tablet Take 1 tablet by mouth daily Yes MADALYN Stevens CNP   cetirizine (ZYRTEC) 10 MG tablet Take 1 tablet by mouth daily Yes Milady Wong DO   fluticasone (FLONASE) 50 MCG/ACT nasal spray SPRAY ONE SPRAY IN EACH NOSTRIL ONCE DAILY Yes Milady Wong DO   Humidifiers (VICKS MINI COOLMIST HUMIDIFIER) [address] [facility] 1 Device by Does not apply route nightly Yes MADALYN Sanabria CNP        No Known Allergies    Past Medical History:   Diagnosis Date    Abnormal Pap smear of cervix     12 yrs ago per patient     Cervical dysplasia     Endometriosis        Past Surgical History:   Procedure Laterality Date    LAPAROSCOPY      LEEP         Social History     Socioeconomic History    Marital status:      Spouse name: Not on file    Number of children: 1    Years of education: Not on file    Highest education level: Not on file   Occupational History    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   Tobacco Use    Smoking status: Never Smoker    Smokeless tobacco: Never Used   Vaping Use    Vaping Use: Never used   Substance and Sexual Activity    Alcohol use: Yes     Comment: Socially (beer)occasional    Drug use: No    Sexual activity: Yes     Partners: Male     Comment: Medhat Rhodes    Other Topics Concern    Not on file   Social History Narrative    Not on file     Social Determinants of Health     Financial Resource Strain: Low Risk     Difficulty of Paying Living Expenses: Not hard at all   Food Insecurity: No Food Insecurity    Worried About Running Out of Food in the Last Year: Never true    [address] in the Last Year: Never true   Transportation Needs:     Lack of Transportation (Medical):  Lack of Transportation (Non-Medical):    Physical Activity:     Days of Exercise per Week:     Minutes of Exercise per Session:    Stress:     Feeling of Stress :    Social Connections:     Frequency of Communication with Friends and Family:     Frequency of Social Gatherings with Friends and Family:     Attends Druze Services:     Active Member of Clubs or Organizations:     Attends Club or Organization Meetings:     Marital Status:    Intimate Partner Violence:     Fear of Current or Ex-Partner:     Emotionally Abused:     Physically Abused:     Sexually Abused:         Family History   Problem Relation Age of Onset    Seizures Mother     Cancer Paternal Aunt     Cancer Maternal Grandfather         Prostate    High Blood Pressure Maternal Grandfather     Colon Cancer Maternal Grandfather        ADVANCE DIRECTIVE: N, <no information>    Vitals:    09/01/21 1002   BP: 100/68   Site: Left Upper Arm   Position: Sitting   Cuff Size: Medium Adult   Pulse: 71   SpO2: 98%   Weight: 154 lb (69.9 kg)   Height: 5' 11\" (1.803 m)     Estimated body mass index is 21.48 kg/m² as calculated from the following:    Height as of this encounter: 5' 11\" (1.803 m). Weight as of this encounter: 154 lb (69.9 kg). Physical Exam  Vitals and nursing note reviewed.    Constitutional: General: She is not in acute distress. Appearance: Normal appearance. She is well-developed. She is not ill-appearing, toxic-appearing or diaphoretic. HENT:      Head: Normocephalic and atraumatic. Right Ear: Tympanic membrane, ear canal and external ear normal. There is no impacted cerumen. Left Ear: Tympanic membrane, ear canal and external ear normal. There is no impacted cerumen. Nose: Nose normal. No congestion or rhinorrhea. Mouth/Throat:      Mouth: Mucous membranes are moist.      Pharynx: Oropharynx is clear. No oropharyngeal exudate. Eyes:      General: No scleral icterus. Right eye: No discharge. Left eye: No discharge. Extraocular Movements: Extraocular movements intact. Conjunctiva/sclera: Conjunctivae normal.      Pupils: Pupils are equal, round, and reactive to light. Cardiovascular:      Rate and Rhythm: Normal rate and regular rhythm. Heart sounds: Normal heart sounds, S1 normal and S2 normal. No murmur heard. No friction rub. No gallop. Pulmonary:      Effort: Pulmonary effort is normal. No respiratory distress. Breath sounds: Normal breath sounds. No stridor. No wheezing, rhonchi or rales. Abdominal:      General: Abdomen is flat. Bowel sounds are normal. There is no distension. Palpations: Abdomen is soft. There is no mass. Tenderness: There is no abdominal tenderness. There is no guarding or rebound. Hernia: No hernia is present. Musculoskeletal:      Cervical back: Normal range of motion and neck supple. No rigidity or tenderness. Lymphadenopathy:      Cervical: No cervical adenopathy. Skin:     General: Skin is warm and dry. Coloration: Skin is not jaundiced. Comments: Small 1/4 cm size dark red appearing raised lesion   Neurological:      General: No focal deficit present. Mental Status: She is alert and oriented to person, place, and time. Mental status is at baseline. Cranial Nerves: No cranial nerve deficit. Deep Tendon Reflexes:      Reflex Scores:       Brachioradialis reflexes are 2+ on the right side and 2+ on the left side. Patellar reflexes are 2+ on the right side and 2+ on the left side. Psychiatric:         Speech: Speech normal.         No flowsheet data found.     Lab Results   Component Value Date    CHOL 198 09/11/2019    CHOL 197 11/01/2017    CHOL 210 11/01/2016    TRIG 77 09/11/2019    TRIG 60 11/01/2017    TRIG 95 11/01/2016    HDL 59 09/11/2019    HDL 60 11/01/2017    HDL 61 11/01/2016    HDL 64 06/13/2011    HDL 61 03/05/2010    LDLCALC 124 09/11/2019    LDLCALC 125 11/01/2017    LDLCALC 130 11/01/2016    GLUCOSE 98 09/11/2019       The 10-year ASCVD risk score (Kita Centeno, et al., 2013) is: 0.3%    Values used to calculate the score:      Age: 39 years      Sex: Female      Is Non- : No      Diabetic: No      Tobacco smoker: No      Systolic Blood Pressure: 689 mmHg      Is BP treated: No      HDL Cholesterol: 59 mg/dL      Total Cholesterol: 198 mg/dL    Immunization History   Administered Date(s) Administered    Influenza Vaccine, unspecified formulation 11/01/2012, 02/17/2018    Influenza Virus Vaccine 11/01/2012, 10/21/2014, 10/27/2015    Influenza, MDCK Quadv, IM, PF (Flucelvax 2 yrs and older) 02/17/2018    Influenza, Quadv, IM, PF (6 mo and older Fluzone, Flulaval, Fluarix, and 3 yrs and older Afluria) 11/01/2016, 09/11/2019    PPD Test 09/22/2014       Health Maintenance   Topic Date Due    Hepatitis C screen  Never done    Varicella vaccine (1 of 2 - 2-dose childhood series) Never done    COVID-19 Vaccine (1) Never done    HIV screen  Never done    DTaP/Tdap/Td vaccine (1 - Tdap) Never done    Flu vaccine (1) 09/01/2021    Lipid screen  09/11/2024    Cervical cancer screen  10/30/2024    Hepatitis A vaccine  Aged Out    Hepatitis B vaccine  Aged Out    Hib vaccine  Aged Out    Meningococcal (ACWY) vaccine  Aged Out    Pneumococcal 0-64 years Vaccine  Aged Out          ASSESSMENT/PLAN:  1. Routine general medical examination at a health care facility  -     CBC  -     COMPREHENSIVE METABOLIC PANEL  -     TSH without Reflex  -     VITAMIN D 25 HYDROXY  -     VITAMIN B12 & FOLATE  -     IRON AND TIBC  2. Fatigue, unspecified type  -     CBC  -     COMPREHENSIVE METABOLIC PANEL  -     TSH without Reflex  -     VITAMIN D 25 HYDROXY  -     VITAMIN B12 & FOLATE  -     IRON AND TIBC  3. Skin lesion  -     [facility], , Dermatology, [facility]       Blood work today  Referral to derm    An electronic signature was used to authenticate this note.     --Iggy Haider, MADALYN - CNP on 9/1/2021 at 12:50 PM

## 2021-09-21 ENCOUNTER — VIRTUAL VISIT (OUTPATIENT)
Dept: FAMILY MEDICINE CLINIC | Age: 41
End: 2021-09-21
Payer: OTHER GOVERNMENT

## 2021-09-21 DIAGNOSIS — F41.9 ANXIETY AND DEPRESSION: Primary | ICD-10-CM

## 2021-09-21 DIAGNOSIS — F32.A ANXIETY AND DEPRESSION: Primary | ICD-10-CM

## 2021-09-21 PROCEDURE — 99213 OFFICE O/P EST LOW 20 MIN: CPT | Performed by: FAMILY MEDICINE

## 2021-09-21 NOTE — PROGRESS NOTES
TELEHEALTH EVALUATION -- Audio/Visual (During YVYWV-25 public health emergency)    HPI:  Fadumo Kent (:  1980) is a 39 y.o. female,  here for evaluation of the following chief complaint(s):  Depression and Anxiety      ASSESSMENT/PLAN:   Diagnosis Orders   1. Anxiety and depression       Diagnoses and all orders for this visit:    Anxiety and depression  She has paxil at home, and has yet to start. She will continue another 2-4 weeks without medication to see if she continues to improve. If not, she is open to taking it. Discussed medication    SUBJECTIVE/OBJECTIVE:  HPI  She has not started her paxil yet. She feels she is improving on her own. Now working, taking dog out daily for walks, does not feel where she was 1 month ago. Still not having energy in the evening after work and wants to sit and relax but able to get up and walk dog which is an improvement. OK to continue to wait to start the paxil as long as she is functioning well and not sliding back down into how she felt in August.    Review of Systems   As above  Allergic/Immunologic: Negative for immunocompromised state. Psychiatric/Behavioral: Negative for agitation, behavioral problems and confusion. + anxiousness    Physical Exam    Constitutional: [x] Appears well-developed and well-nourished [x] No apparent distress      [] Abnormal-   Mental status  [x] Alert and awake  [x] Oriented to person/place/time [x]Able to follow commands      Eyes:  EOM    [x]  Normal  [] Abnormal-  Sclera  [x]  Normal  [] Abnormal -         Discharge [x]  None visible  [] Abnormal -    HENT:   [x] Normocephalic, atraumatic.   [] Abnormal   [] Mouth/Throat: Mucous membranes are moist.     External Ears [x] Normal  [] Abnormal-     Neck: [x] No visualized mass     Pulmonary/Chest: [x] Respiratory effort normal.  [x] No visualized signs of difficulty breathing or respiratory distress        [] Abnormal-    Able to speak in full sentences without difficulty  Musculoskeletal:   [] Normal gait with no signs of ataxia         [x] Normal range of motion of neck        [] Abnormal-       Neurological:        [x] No Facial Asymmetry (Cranial nerve 7 motor function) (limited exam to video visit)          [x] No gaze palsy        [] Abnormal-         Skin:        [x] No significant exanthematous lesions or discoloration noted on facial skin         [] Abnormal-            Psychiatric:       [x] Normal Affect [] No Hallucinations        [] Abnormal-   Judgment, behavior, thought and mood are normal.        20 m  Time spent today included for this patient visit includes time spent preparing to see the patient  Including review of tests, labs and imaging,   revewing previous history and recent encounters,   obtaining and/or reviewing separately obtained history in care everywhere or record,   performing a medically appropriate examination and/or evaluation;   counseling and educating the patient   ordering medications, tests, or procedures;   referring to other health care specialists if applicable;   documenting clinical information in the electronic health record;   independently interpreting results (not separately reported)   and communicating results to the patient. (During VLMIS-44 public health emergency), evaluation of the following organ systems was limited: Vitals/Constitutional/EENT/Resp/CV/GI//MS/Neuro/Skin/Heme-Lymph-Imm. Pursuant to the emergency declaration under the [address] [place] [address] authority and the [employer] Act, this Virtual Visit was conducted with patient's (and/or legal guardian's) consent, to reduce the patient's risk of exposure to COVID-19 and provide necessary medical care.   The patient (and/or legal guardian) has also been advised to contact this office for worsening conditions or problems, and seek emergency medical treatment and/or call 911 if deemed necessary. Patient initiated the encounter and gave consent for the encounter. Services were provided through a video synchronous discussion virtually to substitute for in-person clinic visit.  Patient and provider were located at their individual homes

## 2021-09-27 DIAGNOSIS — Z12.31 ENCOUNTER FOR SCREENING MAMMOGRAM FOR MALIGNANT NEOPLASM OF BREAST: Primary | ICD-10-CM

## 2021-10-19 ENCOUNTER — HOSPITAL ENCOUNTER (OUTPATIENT)
Dept: WOMENS IMAGING | Age: 41
Discharge: HOME | End: 2021-10-19
Payer: OTHER GOVERNMENT

## 2021-10-19 DIAGNOSIS — Z12.31 ENCOUNTER FOR SCREENING MAMMOGRAM FOR MALIGNANT NEOPLASM OF BREAST: ICD-10-CM

## 2021-10-19 PROCEDURE — 77067 SCR MAMMO BI INCL CAD: CPT

## 2021-10-26 ENCOUNTER — PATIENT MESSAGE (OUTPATIENT)
Dept: FAMILY MEDICINE CLINIC | Age: 41
End: 2021-10-26

## 2021-10-26 DIAGNOSIS — L98.9 SKIN LESION: Primary | ICD-10-CM

## 2021-10-26 NOTE — TELEPHONE ENCOUNTER
From: Richard Massey  To: Steve Lott,   Sent: 10/26/2021 11:21 AM EDT  Subject: Visit Follow-Up Question    Hello! When I was in for my physical I had a blood blister type adam under my left breast. The PA I met with told me if it didn't go away in a week or so to get a referral to a dermatologist. IT looked like it was getting better but it is back and raised again. Can I please have a referral to [facility] in [facility]? Dr. Yvette Watt is in my network. Thank you!

## 2021-11-03 ENCOUNTER — TELEPHONE (OUTPATIENT)
Dept: FAMILY MEDICINE CLINIC | Age: 41
End: 2021-11-03

## 2022-01-14 RX ORDER — CETIRIZINE HYDROCHLORIDE 10 MG/1
TABLET ORAL
Qty: 90 TABLET | Refills: 3 | Status: SHIPPED | OUTPATIENT
Start: 2022-01-14

## 2022-06-17 ENCOUNTER — OFFICE VISIT (OUTPATIENT)
Dept: OBGYN CLINIC | Age: 42
End: 2022-06-17
Payer: OTHER GOVERNMENT

## 2022-06-17 VITALS
WEIGHT: 159.4 LBS | HEART RATE: 66 BPM | TEMPERATURE: 98 F | DIASTOLIC BLOOD PRESSURE: 68 MMHG | SYSTOLIC BLOOD PRESSURE: 106 MMHG | HEIGHT: 71 IN | BODY MASS INDEX: 22.31 KG/M2

## 2022-06-17 DIAGNOSIS — N80.9 ENDOMETRIOSIS: ICD-10-CM

## 2022-06-17 DIAGNOSIS — Z12.4 PAP SMEAR FOR CERVICAL CANCER SCREENING: ICD-10-CM

## 2022-06-17 DIAGNOSIS — Z80.41 FAMILY HISTORY OF OVARIAN CANCER: ICD-10-CM

## 2022-06-17 DIAGNOSIS — Z01.411 ENCNTR FOR GYN EXAM (GENERAL) (ROUTINE) W ABNORMAL FINDINGS: Primary | ICD-10-CM

## 2022-06-17 DIAGNOSIS — N93.9 ABNORMAL UTERINE BLEEDING (AUB): ICD-10-CM

## 2022-06-17 PROCEDURE — 99396 PREV VISIT EST AGE 40-64: CPT | Performed by: OBSTETRICS & GYNECOLOGY

## 2022-06-17 ASSESSMENT — ENCOUNTER SYMPTOMS
CONSTIPATION: 0
VOMITING: 0
SHORTNESS OF BREATH: 0
DIARRHEA: 0
NAUSEA: 0
ABDOMINAL PAIN: 0
SORE THROAT: 0
COUGH: 0

## 2022-06-17 NOTE — PROGRESS NOTES
Annual Exam      CC:   Chief Complaint   Patient presents with    Annual Exam       HPI:  43 y.o. Dawson Turner presents for annual exam.    Patient seen and examined. Patient reports menses are occurring every 21-23 days and are lasting a full 5 days. The first day she has significant cramping and heavy bleeding that leaves her at home in bed. Reports significant fatigue. At age 15 patient had a laparoscopy for painful menses and was diagnosed with endometriosis. She has been on OCPs in the past and feels like her cycles were much more manageable. Has not been on anything in over 10 years. Menarche at age 15. Patient reports her mothers sister was recently diagnosed with epithelial ovarian cancer, mucinous ovarian cancer. Has completed chemotherapy and is doing well. Diagnosed at age 72. Obstetric history significant for . Denies shoulder dystocia, PPH, high order lacerations. Denies hx of GHTN or GDMA. Health Maintenance:  Birth control: None -  is taking testosterone injections  Pregnancy plans: None  Safe relationship: Yes, together 18 years    Screening:  Last pap smear: 2019 - JAYYM, neg HPV  History of abnormal pap smears: Yes - 12 years ago she had a LEEP, reports she believes margins were negative, pap smears have been normal since that time     Vaccines:  Flu vaccine: Yes  COVID-19 vaccine: Has not had      Review of Systems:   Review of Systems   Constitutional: Negative for chills and fever. HENT: Negative for congestion, sneezing and sore throat. Respiratory: Negative for cough and shortness of breath. Cardiovascular: Negative for chest pain and palpitations. Gastrointestinal: Negative for abdominal pain, constipation, diarrhea, nausea and vomiting. Genitourinary: Positive for menstrual problem and vaginal discharge. Negative for dysuria, frequency and pelvic pain. Musculoskeletal: Negative. Neurological: Negative for dizziness and headaches. Psychiatric/Behavioral: Negative. Breast: Denies skin changes, nipple discharge, lesions, dimpling, tenderness or palpable masses     Primary Care Physician: Jasper Encarnacion DO    Obstetric History  OB History    Para Term  AB Living   1 1 1     1   SAB IAB Ectopic Molar Multiple Live Births             1      # Outcome Date GA Lbr Phong/2nd Weight Sex Delivery Anes PTL Lv   1 Term 09   9 lb (4.082 kg) M Vag-Spont EPI N DOROTHY       GynecologicHistory  Menstrual History:   LMP: Patient's last menstrual period was 06/10/2022 (exact date).  Age of Menarche: 15   Menstrual Period: regular   Interval Between menses: 21-23 days   Duration of Menses: 5 days   Menstrual Flow: Heavy   Bleeding between menses: Denies   Painful menses:     Sexual History:   Contraception: see above   Currently is sexually active   5 Lifetime partners   Denies history of STIs   Reports sexual problems - deep pain, has always been that way, somewhat improved with position modification    Pap History:   History of abnormal pap smears: see above   Last pap: see above      Medical History:  Past Medical History:   Diagnosis Date    Abnormal Pap smear of cervix     12 yrs ago per patient     Cervical dysplasia     Endometriosis        Medications:  Current Outpatient Medications   Medication Sig Dispense Refill    cetirizine (ZYRTEC) 10 MG tablet TAKE ONE TABLET BY MOUTH DAILY 90 tablet 3    fluticasone (FLONASE) 50 MCG/ACT nasal spray SPRAY ONE SPRAY IN EACH NOSTRIL ONCE DAILY 1 Bottle 2    Humidifiers (VICKS MINI COOLMIST HUMIDIFIER) MISC 1 Device by Does not apply route nightly 1 each 0     No current facility-administered medications for this visit.        Surgical History:  Past Surgical History:   Procedure Laterality Date    LAPAROSCOPY      LEEP         Allergies:  No Known Allergies    Family History:  Family History   Problem Relation Age of Onset    Seizures Mother     Cancer Paternal Herminia Mckeon Cancer Maternal Grandfather         Prostate    High Blood Pressure Maternal Grandfather     Colon Cancer Maternal Grandfather     Ovarian Cancer Maternal Aunt        Reports personal/family history of cervical, uterine, ovarian, vulvar, breast, or colon cancers. 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   Tobacco Use    Smoking status: Never Smoker    Smokeless tobacco: Never Used   Vaping Use    Vaping Use: Never used   Substance and Sexual Activity    Alcohol use: Yes     Comment: Socially (beer)occasional    Drug use: No    Sexual activity: Yes     Partners: Male     Comment: Lovely Medina    Other Topics Concern    None   Social History Narrative    None     Social Determinants of Health     Financial Resource Strain: Low Risk     Difficulty of Paying Living Expenses: Not hard at all   Food Insecurity: No Food Insecurity    Worried About Running Out of Food in the Last Year: Never true    Minnie of Food in the Last Year: Never true   Transportation Needs:     Lack of Transportation (Medical): Not on file    Lack of Transportation (Non-Medical):  Not on file   Physical Activity:     Days of Exercise per Week: Not on file    Minutes of Exercise per Session: Not on file   Stress:     Feeling of Stress : Not on file   Social Connections:     Frequency of Communication with Friends and Family: Not on file    Frequency of Social Gatherings with Friends and Family: Not on file    Attends Christian Services: Not on file    Active Member of Clubs or Organizations: Not on file    Attends Club or Organization Meetings: Not on file    Marital Status: Not on file   Intimate Partner Violence:     Fear of Current or Ex-Partner: Not on file    Emotionally Abused: Not on file    Physically Abused: Not on file    Sexually Abused: Not on file   Housing Stability:     Unable to Pay for Housing in the Last Year: Not on file    Number of [place] in the Last Year: Not on file    Unstable Housing in the Last Year: Not on file       Objective: Body mass index is 22.23 kg/m². /68 (Site: Left Upper Arm, Position: Sitting, Cuff Size: Medium Adult)   Pulse 66   Temp 98 °F (36.7 °C) (Infrared)   Ht 5' 11\" (1.803 m)   Wt 159 lb 6.4 oz (72.3 kg)   LMP 06/10/2022 (Exact Date)   BMI 22.23 kg/m²     Exam:   Physical Exam  Vitals reviewed. Constitutional:       General: She is not in acute distress. Appearance: She is well-developed. HENT:      Head: Normocephalic and atraumatic. Eyes:      Conjunctiva/sclera: Conjunctivae normal.   Neck:      Thyroid: No thyromegaly. Trachea: No tracheal deviation. Cardiovascular:      Rate and Rhythm: Normal rate and regular rhythm. Heart sounds: Normal heart sounds. No murmur heard. No friction rub. No gallop. Pulmonary:      Effort: Pulmonary effort is normal. No respiratory distress. Breath sounds: No wheezing. Abdominal:      General: There is no distension. Palpations: Abdomen is soft. There is no mass. Tenderness: There is no abdominal tenderness. There is no guarding or rebound. Genitourinary:     Labia:         Right: No rash, tenderness or lesion. Left: No rash, tenderness or lesion. Vagina: Vaginal discharge present. No erythema, tenderness or bleeding. Cervix: No cervical motion tenderness, discharge or friability. Uterus: Not deviated, not enlarged, not fixed and not tender. Adnexa:         Right: No mass, tenderness or fullness. Left: No mass, tenderness or fullness. Musculoskeletal:      Cervical back: Normal range of motion and neck supple. Skin:     General: Skin is warm and dry. Findings: No erythema. Neurological:      Mental Status: She is alert and oriented to person, place, and time. Psychiatric:         Behavior: Behavior normal.         Thought Content: Thought content normal.          Assessment/Plan:  43 y.o. Dawson Turner presenting for her annual exam:    1. Encntr for gyn exam (general) (routine) w abnormal findings     - Pap smear collected today - will call with results     - Age based screening recommendations discussed     - Self breast exams/awareness discussed with the patient     - Healthy lifestyle habits discussed including calcium and vitamin D supplementation     - Will follow-up in 1 year for annual exam     2. Pap smear for cervical cancer screening     - Pap smear collected today - will call with results     - Age based screening recommendations discussed    3.  Abnormal uterine bleeding (AUB)     - Patient with heavy, painful menses     - Laparoscopically confirmed endometriosis in the past     - Benign EMB in 2019 for bleedng     - Recent family history of ovarian cancer - unknown genetic testing     - Differential reviewed including both benign and neoplastic conditions     - Risks, benefits and alternatives were reviewed     - Will start Micronor for heavy menses     - Will have back for US and EMB in preparation for planning endometrial ablation     - Reviewed candidate for bilateral salpingectomy with family history of ovarian cancer as well as diagnostic laparoscopy for endometriosis     - Patient reports she is not ready to proceed with hysterectomy at this time     - Patient will discuss genetic testing with her aunt, if positive or not collected [facility]     - Return precautions reviewed 4. Endometriosis      - See above    5.  Family history of ovarian cancer     - See above       Deb Thompson,

## 2022-08-01 ENCOUNTER — HOSPITAL ENCOUNTER (OUTPATIENT)
Age: 42
Discharge: HOME | End: 2022-08-01
Payer: OTHER GOVERNMENT

## 2022-08-01 ENCOUNTER — OFFICE VISIT (OUTPATIENT)
Dept: OBGYN CLINIC | Age: 42
End: 2022-08-01
Payer: OTHER GOVERNMENT

## 2022-08-01 DIAGNOSIS — N94.89 ADNEXAL MASS: ICD-10-CM

## 2022-08-01 DIAGNOSIS — N93.9 ABNORMAL UTERINE BLEEDING (AUB): Primary | ICD-10-CM

## 2022-08-01 DIAGNOSIS — N80.9 ENDOMETRIOSIS DETERMINED BY LAPAROSCOPY: ICD-10-CM

## 2022-08-01 DIAGNOSIS — Z80.41 FAMILY HISTORY OF OVARIAN CANCER: ICD-10-CM

## 2022-08-01 DIAGNOSIS — N93.9 ABNORMAL UTERINE BLEEDING (AUB): ICD-10-CM

## 2022-08-01 DIAGNOSIS — N94.89 ADNEXAL MASS: Primary | ICD-10-CM

## 2022-08-01 LAB
CA 125: 17.8 U/ML (ref 0–35)
CEA: 0.9 NG/ML (ref 0–5)
LACTATE DEHYDROGENASE: 146 U/L (ref 100–190)

## 2022-08-01 PROCEDURE — 86336 INHIBIN A: CPT

## 2022-08-01 PROCEDURE — 82105 ALPHA-FETOPROTEIN SERUM: CPT

## 2022-08-01 PROCEDURE — 99213 OFFICE O/P EST LOW 20 MIN: CPT | Performed by: OBSTETRICS & GYNECOLOGY

## 2022-08-01 PROCEDURE — 86304 IMMUNOASSAY TUMOR CA 125: CPT

## 2022-08-01 PROCEDURE — 82378 CARCINOEMBRYONIC ANTIGEN: CPT

## 2022-08-01 PROCEDURE — 83615 LACTATE (LD) (LDH) ENZYME: CPT

## 2022-08-01 PROCEDURE — 36415 COLL VENOUS BLD VENIPUNCTURE: CPT

## 2022-08-01 PROCEDURE — 84704 HCG FREE BETACHAIN TEST: CPT

## 2022-08-01 PROCEDURE — 83520 IMMUNOASSAY QUANT NOS NONAB: CPT

## 2022-08-01 PROCEDURE — 76856 US EXAM PELVIC COMPLETE: CPT | Performed by: OBSTETRICS & GYNECOLOGY

## 2022-08-01 NOTE — PROGRESS NOTES
Return Gyn Office Visit    CC:   Chief Complaint   Patient presents with    Follow-up         HPI:  Sawyer Preston is a 43 y.o. female who presents for US follow-up of heavy, painful menses. Patient is seen and examined today. Patient is overall doing well. Hx of heavy menses with significant cramping as well as significant fatigue with menses. Denies chest pain, shortness of breath, fever, chills, nausea, vomiting. Patient with a sister recently diagnosed with epithelial ovarian cancer. Genetic testing and labs should be back in a few weeks. Review of Systems - The following ROS was otherwise negative, except as noted in the HPI: constitutional, respiratory, cardiovascular, gastrointestinal, genitourinary    Objective:  Temp 98.1 °F (36.7 °C)   Wt 161 lb 9.6 oz (73.3 kg)   BMI 22.54 kg/m²     Physical Exam  Vitals reviewed. Constitutional:       General: She is not in acute distress. Appearance: She is well-developed. HENT:      Head: Normocephalic and atraumatic. Eyes:      Conjunctiva/sclera: Conjunctivae normal.   Cardiovascular:      Rate and Rhythm: Normal rate. Pulmonary:      Effort: Pulmonary effort is normal. No respiratory distress. Abdominal:      General: There is no distension. Palpations: Abdomen is soft. Tenderness: There is no abdominal tenderness. There is no guarding or rebound. Musculoskeletal:         General: No swelling. Skin:     General: Skin is warm and dry. Neurological:      Mental Status: She is alert and oriented to person, place, and time. Psychiatric:         Mood and Affect: Mood normal.         Behavior: Behavior normal.         Thought Content: Thought content normal.       Ultrasound:   PELVIC ULTRASOUND with DOPPLER INTERROGATION   NON OB    DATE: 8/1/22    PHYSICIAN: JIMBO Armendariz D.O.     SONOGRAPHER: BRAYAN Finney RDMS    INDICATION: abnormal uterine bleeding    TYPE OF SCAN: vaginal    FINDINGS:    The cul de sac is normal. No free fluid appreciated. The cervix is normal and not enlarged. Nabothian cyst/s is noted within the uterine cervix. The uterus measures 10.06 cm x 7.73 cm x 5.01 cm. The uterus is anteverted. The endometrium measures 4.28 mm. The myometrium is homogeneous in appearance. No uterine anomalies are noted. The right ovary is present. The right ovary measures 5.16 cm x 7.42 cm x 4.17 cm. Ovary findings: Complex cyst seen measuring 4.23 cm x 3.68 cm x 6.74 cm, Thick internal septations with increased vascularity. The scant amount of right adnexa free fluid is seen adjacent to the right ovary. The left ovary is present and multi-follicular. The left ovary measures 2.19 cm x 2.63 cm x 1.37 cm. Ovary findings: No masses seen. The left adnexa is normal.    Doppler interrogation demonstrates normal blood flow to the right and left ovaries. IMPRESSION: Normal uterus. Complex right ovarian cyst seen measuring 4.23 cm x 3.68 cm x 6.74 cm with thick internal septations with increased vascularity. Normal left ovary. The scant amount of right adnexa free fluid is seen adjacent to the right ovary. Imaging is limited secondary to bowel gas. The patient is well aware of the limitations of ultrasound in the detection of anomalies of the abdomen and pelvis. Assessment/Plan:     Demian Stevenson is a 43 y.o. female who presents for US follow-up of heavy, painful menses. 1. Adnexal mass     - Complex right ovarian cyst seen measuring 4.23 cm x 3.68 cm x 6.74 cm with thick internal septations with increased vascularity and associated free fluid in the right adnexa     - Given patient's history of sister with epithelial ovarian cancer will collect labs today to determine risk of underlying malignancy  - ; Future  - CEA; Future  - HCG, TUMOR MARKER; Future  - Lactate Dehydrogenase; Future  - AFP Tumor Marker;  Future     - Given patient family history of concerning markers on US appearance will refer to Dr. Rajeev Whitmore for removal - referral placed  - Uriel Dhaliwal MD, Gynecologic Oncology, [facility]     - Will follow-up after consultation    2. Abnormal uterine bleeding (AUB)     - Patient with heavy, painful menses     - Laparoscopically confirmed endometriosis in the past     - Benign EMB in 2019 for bleedng     - Recent family history of ovarian cancer - unknown genetic testing     - Differential reviewed including both benign and neoplastic conditions     - US with 4mm endometrial thickness     - Risks, benefits and alternatives were reviewed     - Started Micronor for heavy menses     - Reviewed candidate for bilateral salpingectomy with family history of ovarian cancer as well as diagnostic laparoscopy for endometriosis - considering endometrial ablation for management of AUB     - Patient reports she is not ready to proceed with hysterectomy at this time     - Return precautions reviewed    3. Endometriosis determined by laparoscopy     - Patient with a history of laparoscopically confirmed endometriosis and painful menses    4. Family history of ovarian cancer     - Patient's sister diagnosed with Mucinous, Epithelial ovarian cancer     - Genetic testing is pending     - Patient today with complex ovarian mass with thick internal septations with increased internal vascularity.       Quinton Helms, DO

## 2022-08-02 LAB — HCG TUMOR MARKER: <1 IU/L (ref 0–5)

## 2022-08-03 LAB — AFP: 1.5 UG/L

## 2022-08-06 LAB — MISCELLANEOUS LAB TEST ORDER: NORMAL

## 2022-08-07 LAB — MISCELLANEOUS LAB TEST RESULT: NORMAL

## 2022-08-13 VITALS — TEMPERATURE: 98.1 F | BODY MASS INDEX: 22.54 KG/M2 | WEIGHT: 161.6 LBS

## 2022-08-13 PROBLEM — N94.6 DYSMENORRHEA: Status: ACTIVE | Noted: 2022-08-13

## 2022-08-13 PROBLEM — N93.9 ABNORMAL UTERINE BLEEDING (AUB): Status: ACTIVE | Noted: 2022-08-13

## 2022-08-13 PROBLEM — N94.89 ADNEXAL MASS: Status: ACTIVE | Noted: 2022-08-13

## 2022-08-26 ENCOUNTER — TELEPHONE (OUTPATIENT)
Dept: FAMILY MEDICINE CLINIC | Age: 42
End: 2022-08-26

## 2022-08-26 DIAGNOSIS — N94.89 ADNEXAL MASS: Primary | ICD-10-CM

## 2022-08-26 NOTE — TELEPHONE ENCOUNTER
[facility] called stating that the referral that Dr Brandi Boyle placed for Dr Yao Benjamin needs to come from PCP for pt's insurance to approve. Referral is pended.  Once signed, needs faxed to MARYBETH at [phone]

## 2022-09-14 ENCOUNTER — TRANSCRIBE ORDERS (OUTPATIENT)
Dept: ADMINISTRATIVE | Age: 42
End: 2022-09-14

## 2022-09-14 ENCOUNTER — HOSPITAL ENCOUNTER (OUTPATIENT)
Dept: ULTRASOUND IMAGING | Age: 42
Discharge: HOME | End: 2022-09-14
Payer: OTHER GOVERNMENT

## 2022-09-14 DIAGNOSIS — N83.8 BROAD LIGAMENT LACERATION SYNDROME: Primary | ICD-10-CM

## 2022-09-14 DIAGNOSIS — N83.8 BROAD LIGAMENT LACERATION SYNDROME: ICD-10-CM

## 2022-09-14 PROCEDURE — 76856 US EXAM PELVIC COMPLETE: CPT

## 2022-10-20 ENCOUNTER — HOSPITAL ENCOUNTER (OUTPATIENT)
Dept: WOMENS IMAGING | Age: 42
Discharge: HOME | End: 2022-10-20
Payer: OTHER GOVERNMENT

## 2022-10-20 DIAGNOSIS — Z12.31 VISIT FOR SCREENING MAMMOGRAM: ICD-10-CM

## 2022-10-20 PROCEDURE — 77067 SCR MAMMO BI INCL CAD: CPT

## 2022-12-07 ENCOUNTER — OFFICE VISIT (OUTPATIENT)
Dept: URGENT CARE | Age: 42
End: 2022-12-07

## 2022-12-07 ENCOUNTER — TELEPHONE (OUTPATIENT)
Dept: FAMILY MEDICINE CLINIC | Age: 42
End: 2022-12-07

## 2022-12-07 VITALS
WEIGHT: 162.4 LBS | BODY MASS INDEX: 22.73 KG/M2 | DIASTOLIC BLOOD PRESSURE: 72 MMHG | HEIGHT: 71 IN | HEART RATE: 64 BPM | RESPIRATION RATE: 17 BRPM | SYSTOLIC BLOOD PRESSURE: 100 MMHG | TEMPERATURE: 98.5 F | OXYGEN SATURATION: 95 %

## 2022-12-07 DIAGNOSIS — J03.90 TONSILLITIS: Primary | ICD-10-CM

## 2022-12-07 LAB — STREPTOCOCCUS A RNA: NEGATIVE

## 2022-12-07 RX ORDER — AMOXICILLIN AND CLAVULANATE POTASSIUM 875; 125 MG/1; MG/1
1 TABLET, FILM COATED ORAL 2 TIMES DAILY
Qty: 20 TABLET | Refills: 0 | Status: SHIPPED | OUTPATIENT
Start: 2022-12-07 | End: 2022-12-17

## 2022-12-07 ASSESSMENT — ENCOUNTER SYMPTOMS
SHORTNESS OF BREATH: 0
RHINORRHEA: 0
COUGH: 1
SINUS PRESSURE: 1
WHEEZING: 0

## 2022-12-07 NOTE — TELEPHONE ENCOUNTER
----- Message from Madison Goldbergjaswant sent at 12/7/2022  8:18 AM EST -----  Subject: Appointment Request    Reason for Call: Established Patient Appointment needed: Semi-Routine   Cough, Cold Symptoms    QUESTIONS    Reason for appointment request? No appointments available during search     Additional Information for Provider? Pt is having sore throat that hurts   to swallow she has swollen bumps on the back of her throat. Here symptoms   started about 4 days ago.  Someone in the house has the flu but she   believes it could be strep.  ---------------------------------------------------------------------------  --------------  Yamileth [facility]es INFO  [phone]; OK to leave message on voicemail,Do not leave any message,   patient will call back for answer  ---------------------------------------------------------------------------  --------------  SCRIPT ANSWERS  COVID Screen: Red

## 2022-12-07 NOTE — PROGRESS NOTES
Vashti Arellano (:  1980) is a 43 y.o. female,New patient, here for evaluation of the following chief complaint(s):  Pharyngitis and Cough (Dry cough)      ASSESSMENT/PLAN:    ICD-10-CM    1. Tonsillitis  J03.90 amoxicillin-clavulanate (AUGMENTIN) 875-125 MG per tablet     POCT Rapid Strep A DNA (Alere i)       -patient declined influenza testing today   -rapid strep A negative   -OTC symptom relief   -return in 5 days if not improved    Return in about 5 days (around 2022). SUBJECTIVE/OBJECTIVE:  43year old female presents with symptoms of sore throat and dry cough ongoing since 2022. Sore throat is worsening. Had max temp of 100.4 on . Had body aches on  but that has resolved. Denies known strep or COVID exposure but was possibly exposed to the flu. Has taken Aleve for symptoms without relief. History provided by:  Patient   used: No    Pharyngitis  Location:  Middle  Quality:  Feels sharp pain with swallowing  Duration:  4 days  Timing:  Intermittent (with swallowing)  Progression:  Worsening  Chronicity:  New  Relieved by:  Nothing  Worsened by:  Swallowing  Ineffective treatments:  Cough drops, spray  Associated symptoms: cough, fatigue and fever    Associated symptoms: no congestion, no ear pain, no rhinorrhea, no shortness of breath and no wheezing    Cough  Associated symptoms include a fever. Pertinent negatives include no chills, ear pain, rhinorrhea, shortness of breath or wheezing. Vitals:    22 1034   BP: 100/72   Site: Left Upper Arm   Position: Sitting   Cuff Size: Medium Adult   Pulse: 64   Resp: 17   Temp: 98.5 °F (36.9 °C)   TempSrc: Oral   SpO2: 95%   Weight: 162 lb 6.4 oz (73.7 kg)   Height: 5' 11\" (1.803 m)       Review of Systems   Constitutional:  Positive for fatigue and fever. Negative for chills. HENT:  Positive for sinus pressure. Negative for congestion, ear pain and rhinorrhea. Respiratory:  Positive for cough. Negative for shortness of breath and wheezing. Dry cough, intermittent; no aggravating factors      Physical Exam  Constitutional:       Appearance: Normal appearance. HENT:      Head: Normocephalic. Right Ear: Tympanic membrane and ear canal normal.      Left Ear: Tympanic membrane and ear canal normal.      Nose: Nose normal.      Mouth/Throat:      Pharynx: Oropharyngeal exudate and posterior oropharyngeal erythema present. Comments: + exudate and erythema to both tonsils   Eyes:      Pupils: Pupils are equal, round, and reactive to light. Cardiovascular:      Rate and Rhythm: Normal rate. Pulmonary:      Effort: Pulmonary effort is normal.      Breath sounds: Normal breath sounds. Neurological:      General: No focal deficit present. Mental Status: She is alert. Psychiatric:         Mood and Affect: Mood normal.         Behavior: Behavior normal.         An electronic signature was used to authenticate this note.     --MADALYN Nieto - CNP

## 2022-12-08 DIAGNOSIS — J03.90 TONSILLITIS: Primary | ICD-10-CM

## 2022-12-08 RX ORDER — AZITHROMYCIN 500 MG/1
500 TABLET, FILM COATED ORAL DAILY
Qty: 5 TABLET | Refills: 0 | Status: SHIPPED | OUTPATIENT
Start: 2022-12-08 | End: 2022-12-13

## 2023-03-23 ENCOUNTER — HOSPITAL ENCOUNTER (OUTPATIENT)
Dept: ULTRASOUND IMAGING | Age: 43
Discharge: HOME | End: 2023-03-23
Payer: OTHER GOVERNMENT

## 2023-03-23 DIAGNOSIS — N92.1 MENOMETRORRHAGIA: ICD-10-CM

## 2023-03-23 DIAGNOSIS — N94.89 TUBO-OVARIAN MASS: ICD-10-CM

## 2023-03-23 DIAGNOSIS — Z80.41 FHX: OVARIAN CANCER: ICD-10-CM

## 2023-03-23 PROCEDURE — 76830 TRANSVAGINAL US NON-OB: CPT

## 2023-03-23 PROCEDURE — 76856 US EXAM PELVIC COMPLETE: CPT

## 2023-03-29 NOTE — PROGRESS NOTES
3/31/2023    Vashti Cotton (:  1980) is a 37 y.o. female, here for a preventive medicine evaluation. Chief Complaint   Patient presents with    Annual Exam        Diagnosis Orders   1. Annual physical exam  CBC with Auto Differential    Lipid Panel    Comprehensive Metabolic Panel    TSH with Reflex to FT4    Vitamin D 25 Hydroxy      2. Adnexal mass, history of  [place], Riaz Estrada, DO, Obstetrics, [facility]      3. H/O menorrhagia  [place], Trao Nataile, DO, Obstetrics, [facility]      4. Deviated septum  Mariella Duvall, , Otolaryngology, Samuel Ramírez was seen today for annual exam.    Diagnoses and all orders for this visit:    Annual physical exam  -     CBC with Auto Differential; Future  -     Lipid Panel; Future  -     Comprehensive Metabolic Panel; Future  -     TSH with Reflex to FT4; Future  -     Vitamin D 25 Hydroxy; Future    Adnexal mass, history of,/  H/O menorrhagia  Needs updated referral  -     Riaz Zambrano, DO, Obstetrics, [facility]  Also: cont to follow with Dr Jagdish Parisi for breast cyst, doesn't need referral for this    Deviated septum  Would like to see ent, affecting breathing  -     Mariella Duvall DO, Otolaryngology, [facility]        Patient Active Problem List   Diagnosis    Adnexal mass    Abnormal uterine bleeding (AUB)    Dysmenorrhea    Menorrhagia with irregular cycle    Encounter for genetic counseling and testing    Other noninflammatory disorders of ovary, fallopian tube and broad ligament       Review of Systems   Constitutional: Negative for unexpected weight change. HENT: Negative. Eyes: Negative for redness. Respiratory: Negative for shortness of breath. hard to breathe thru nose  Cardiovascular: Negative for chest pain and leg swelling. Gastrointestinal: Negative for constipation, diarrhea, nausea and vomiting. Skin: Negative for pallor.    Allergic/Immunologic: Negative for

## 2023-03-31 ENCOUNTER — OFFICE VISIT (OUTPATIENT)
Dept: FAMILY MEDICINE CLINIC | Age: 43
End: 2023-03-31
Payer: OTHER GOVERNMENT

## 2023-03-31 VITALS
HEART RATE: 80 BPM | BODY MASS INDEX: 22.96 KG/M2 | SYSTOLIC BLOOD PRESSURE: 104 MMHG | RESPIRATION RATE: 14 BRPM | WEIGHT: 164 LBS | OXYGEN SATURATION: 98 % | TEMPERATURE: 97.3 F | DIASTOLIC BLOOD PRESSURE: 74 MMHG | HEIGHT: 71 IN

## 2023-03-31 DIAGNOSIS — Z00.00 ANNUAL PHYSICAL EXAM: Primary | ICD-10-CM

## 2023-03-31 DIAGNOSIS — J34.2 DEVIATED SEPTUM: ICD-10-CM

## 2023-03-31 DIAGNOSIS — N94.89 ADNEXAL MASS: ICD-10-CM

## 2023-03-31 DIAGNOSIS — Z87.42 H/O MENORRHAGIA: ICD-10-CM

## 2023-03-31 PROBLEM — N92.1 MENORRHAGIA WITH IRREGULAR CYCLE: Status: ACTIVE | Noted: 2023-03-31

## 2023-03-31 PROBLEM — N83.8 OTHER NONINFLAMMATORY DISORDERS OF OVARY, FALLOPIAN TUBE AND BROAD LIGAMENT: Status: ACTIVE | Noted: 2023-03-31

## 2023-03-31 PROCEDURE — 99396 PREV VISIT EST AGE 40-64: CPT | Performed by: FAMILY MEDICINE

## 2023-03-31 SDOH — ECONOMIC STABILITY: INCOME INSECURITY: HOW HARD IS IT FOR YOU TO PAY FOR THE VERY BASICS LIKE FOOD, HOUSING, MEDICAL CARE, AND HEATING?: NOT HARD AT ALL

## 2023-03-31 SDOH — ECONOMIC STABILITY: FOOD INSECURITY: WITHIN THE PAST 12 MONTHS, YOU WORRIED THAT YOUR FOOD WOULD RUN OUT BEFORE YOU GOT MONEY TO BUY MORE.: NEVER TRUE

## 2023-03-31 SDOH — ECONOMIC STABILITY: HOUSING INSECURITY
IN THE LAST 12 MONTHS, WAS THERE A TIME WHEN YOU DID NOT HAVE A STEADY PLACE TO SLEEP OR SLEPT IN A SHELTER (INCLUDING NOW)?: NO

## 2023-03-31 SDOH — ECONOMIC STABILITY: FOOD INSECURITY: WITHIN THE PAST 12 MONTHS, THE FOOD YOU BOUGHT JUST DIDN'T LAST AND YOU DIDN'T HAVE MONEY TO GET MORE.: NEVER TRUE

## 2023-03-31 ASSESSMENT — ANXIETY QUESTIONNAIRES
6. BECOMING EASILY ANNOYED OR IRRITABLE: 0
3. WORRYING TOO MUCH ABOUT DIFFERENT THINGS: 0
1. FEELING NERVOUS, ANXIOUS, OR ON EDGE: 0
5. BEING SO RESTLESS THAT IT IS HARD TO SIT STILL: 0
7. FEELING AFRAID AS IF SOMETHING AWFUL MIGHT HAPPEN: 0
2. NOT BEING ABLE TO STOP OR CONTROL WORRYING: 0
IF YOU CHECKED OFF ANY PROBLEMS ON THIS QUESTIONNAIRE, HOW DIFFICULT HAVE THESE PROBLEMS MADE IT FOR YOU TO DO YOUR WORK, TAKE CARE OF THINGS AT HOME, OR GET ALONG WITH OTHER PEOPLE: NOT DIFFICULT AT ALL
GAD7 TOTAL SCORE: 0
4. TROUBLE RELAXING: 0

## 2023-03-31 ASSESSMENT — PATIENT HEALTH QUESTIONNAIRE - PHQ9
SUM OF ALL RESPONSES TO PHQ QUESTIONS 1-9: 0
SUM OF ALL RESPONSES TO PHQ QUESTIONS 1-9: 0
2. FEELING DOWN, DEPRESSED OR HOPELESS: 0
9. THOUGHTS THAT YOU WOULD BE BETTER OFF DEAD, OR OF HURTING YOURSELF: 0
8. MOVING OR SPEAKING SO SLOWLY THAT OTHER PEOPLE COULD HAVE NOTICED. OR THE OPPOSITE, BEING SO FIGETY OR RESTLESS THAT YOU HAVE BEEN MOVING AROUND A LOT MORE THAN USUAL: 0
4. FEELING TIRED OR HAVING LITTLE ENERGY: 0
7. TROUBLE CONCENTRATING ON THINGS, SUCH AS READING THE NEWSPAPER OR WATCHING TELEVISION: 0
SUM OF ALL RESPONSES TO PHQ QUESTIONS 1-9: 0
3. TROUBLE FALLING OR STAYING ASLEEP: 0
5. POOR APPETITE OR OVEREATING: 0
SUM OF ALL RESPONSES TO PHQ9 QUESTIONS 1 & 2: 0
SUM OF ALL RESPONSES TO PHQ QUESTIONS 1-9: 0
1. LITTLE INTEREST OR PLEASURE IN DOING THINGS: 0
6. FEELING BAD ABOUT YOURSELF - OR THAT YOU ARE A FAILURE OR HAVE LET YOURSELF OR YOUR FAMILY DOWN: 0
10. IF YOU CHECKED OFF ANY PROBLEMS, HOW DIFFICULT HAVE THESE PROBLEMS MADE IT FOR YOU TO DO YOUR WORK, TAKE CARE OF THINGS AT HOME, OR GET ALONG WITH OTHER PEOPLE: 0

## 2023-05-25 ENCOUNTER — PATIENT MESSAGE (OUTPATIENT)
Dept: FAMILY MEDICINE CLINIC | Age: 43
End: 2023-05-25

## 2023-05-30 NOTE — TELEPHONE ENCOUNTER
Partha Kingston, I am so sorry I was not aware that we had to send the Referrals to [facility] [place]. I just sent them both today and filled out the necessary referral form for them as well. Hopefully this will get this moving for you now.  Again I am so sorry for the delay  Chad Hardy

## 2023-10-06 DIAGNOSIS — Z12.39 SCREENING BREAST EXAMINATION: ICD-10-CM

## 2023-10-06 DIAGNOSIS — R92.30 DENSE BREAST: Primary | ICD-10-CM

## 2023-10-06 DIAGNOSIS — R92.2 DENSE BREAST: Primary | ICD-10-CM

## 2023-10-24 ENCOUNTER — HOSPITAL ENCOUNTER (OUTPATIENT)
Dept: WOMENS IMAGING | Age: 43
Discharge: HOME | End: 2023-10-24
Payer: OTHER GOVERNMENT

## 2023-10-24 DIAGNOSIS — Z12.39 SCREENING BREAST EXAMINATION: ICD-10-CM

## 2023-10-24 DIAGNOSIS — R92.2 DENSE BREAST: ICD-10-CM

## 2023-10-24 DIAGNOSIS — R92.30 DENSE BREAST: ICD-10-CM

## 2023-10-24 PROCEDURE — 77063 BREAST TOMOSYNTHESIS BI: CPT

## 2023-12-01 ENCOUNTER — OFFICE VISIT (OUTPATIENT)
Dept: URGENT CARE | Age: 43
End: 2023-12-01

## 2023-12-01 VITALS
RESPIRATION RATE: 18 BRPM | SYSTOLIC BLOOD PRESSURE: 112 MMHG | WEIGHT: 170 LBS | OXYGEN SATURATION: 98 % | DIASTOLIC BLOOD PRESSURE: 66 MMHG | BODY MASS INDEX: 23.71 KG/M2 | TEMPERATURE: 98.2 F | HEART RATE: 72 BPM

## 2023-12-01 DIAGNOSIS — J02.9 SORE THROAT: ICD-10-CM

## 2023-12-01 DIAGNOSIS — J02.9 ACUTE VIRAL PHARYNGITIS: Primary | ICD-10-CM

## 2023-12-01 DIAGNOSIS — Z20.818 EXPOSURE TO STREP THROAT: ICD-10-CM

## 2023-12-01 LAB — STREPTOCOCCUS A RNA: NEGATIVE

## 2023-12-01 ASSESSMENT — ENCOUNTER SYMPTOMS
SINUS PRESSURE: 0
EYE PAIN: 0
COUGH: 0
EYE REDNESS: 0
SORE THROAT: 1
SHORTNESS OF BREATH: 0
ABDOMINAL PAIN: 0
DIARRHEA: 0
VOMITING: 0

## 2023-12-01 NOTE — PATIENT INSTRUCTIONS
- Pt to drink lots of fluids  - Pt ok to take tylenol and ibuprofen as needed  - Pt to call if any symptoms worsen or follow up with PCP  - Pt to go to ER if have any shortness of breath or chest pain

## 2023-12-04 LAB — BACTERIA THROAT AEROBE CULT: NORMAL

## 2024-01-02 NOTE — PROGRESS NOTES
inferior turbinate or polyps medial to the middle cisco, 4= large polyps causing almost complete congestion/obstruction of the interior meatus)  Edema score = 1 (0 = absent, 1 = mild, 2 = severe)  Discharge score = 0 (0 = no discharge, 1 = clear thin discharge, 2 = thick purulent discharge)    Left Nasal Cavity and Paranasal Sinuses:    Polyp score = 0 (0 = no polyps, 1 = small polyps in middle meatus not reaching below the inferior border of the middle cisco, 2 = polyps reaching below the middle border of the middle turbinate, 3= large polyps reaching the lower border of the inferior turbinate or polyps medial to the middle cisco, 4= large polyps causing almost complete congestion/obstruction of the interior meatus)  Edema score = 1 (0 = absent, 1 = mild, 2 = severe)  Discharge score = 0 (0 = no discharge, 1 = clear thin discharge, 2 = thick purulent discharge)    Septum: intact and S-shaped deviated septum to the left anteriorly and to the right posteriorly with contact to the inferior turbinate  Other: The inferior and middle turbinates were examined.      There were no complications.     ASSESSMENT/PLAN  Vashti is a very pleasant 43 y.o. female with     1. Chronic pansinusitis  Given her history of for sinus infection rings requiring antibiotics per year I believe it is reasonable to get a CT scan of her sinuses to rule out underlying sinus disease.  Exam today did not reveal any purulent mucus and she is feeling well overall so I do not feel it is necessary for antibiotics at this point in time.  - MS NASAL ENDOSCOPY DIAGNOSTIC UNI/BI SPX  - CT SINUS WO CONTRAST; Future    2. Seasonal allergies  We will add Astelin to her allergy regimen to see if this will help with her allergy symptoms.  - azelastine (ASTELIN) 0.1 % nasal spray; 1 spray by Nasal route 2 times daily Use in each nostril as directed  Dispense: 30 mL; Refill: 3    3. Deviated nasal septum  She does have an S-shaped deviated septum which was

## 2024-01-03 ENCOUNTER — OFFICE VISIT (OUTPATIENT)
Dept: ENT CLINIC | Age: 44
End: 2024-01-03
Payer: OTHER GOVERNMENT

## 2024-01-03 VITALS
SYSTOLIC BLOOD PRESSURE: 132 MMHG | HEART RATE: 80 BPM | BODY MASS INDEX: 23.99 KG/M2 | WEIGHT: 172 LBS | DIASTOLIC BLOOD PRESSURE: 68 MMHG

## 2024-01-03 DIAGNOSIS — J34.2 DEVIATED NASAL SEPTUM: ICD-10-CM

## 2024-01-03 DIAGNOSIS — J32.4 CHRONIC PANSINUSITIS: Primary | ICD-10-CM

## 2024-01-03 DIAGNOSIS — J30.2 SEASONAL ALLERGIES: ICD-10-CM

## 2024-01-03 PROCEDURE — 99204 OFFICE O/P NEW MOD 45 MIN: CPT | Performed by: STUDENT IN AN ORGANIZED HEALTH CARE EDUCATION/TRAINING PROGRAM

## 2024-01-03 PROCEDURE — 31231 NASAL ENDOSCOPY DX: CPT | Performed by: STUDENT IN AN ORGANIZED HEALTH CARE EDUCATION/TRAINING PROGRAM

## 2024-01-03 RX ORDER — AZELASTINE 1 MG/ML
1 SPRAY, METERED NASAL 2 TIMES DAILY
Qty: 30 ML | Refills: 3 | Status: SHIPPED | OUTPATIENT
Start: 2024-01-03

## 2024-01-03 ASSESSMENT — ENCOUNTER SYMPTOMS
RHINORRHEA: 0
NAUSEA: 0
SINUS PAIN: 1
SHORTNESS OF BREATH: 0
SINUS PRESSURE: 1
VOMITING: 0
EYE PAIN: 0
COUGH: 0

## 2024-01-11 ENCOUNTER — HOSPITAL ENCOUNTER (OUTPATIENT)
Dept: CT IMAGING | Age: 44
Discharge: HOME | End: 2024-01-11
Attending: STUDENT IN AN ORGANIZED HEALTH CARE EDUCATION/TRAINING PROGRAM
Payer: OTHER GOVERNMENT

## 2024-01-11 DIAGNOSIS — J32.4 CHRONIC PANSINUSITIS: ICD-10-CM

## 2024-01-11 PROCEDURE — 70486 CT MAXILLOFACIAL W/O DYE: CPT

## 2024-01-15 ENCOUNTER — TELEPHONE (OUTPATIENT)
Dept: ENT CLINIC | Age: 44
End: 2024-01-15

## 2024-01-15 NOTE — TELEPHONE ENCOUNTER
----- Message from Tristen Hollins DO sent at 1/15/2024  1:36 PM EST -----  Please let Vashti know the CT scan showed a cyst on the left cheek sinus which is seen on up to 30% of patients and a deviated septum.  She should continue taking the Astelin and follow-up in 2 to 3 weeks.  Please have her schedule an appointment.

## 2024-03-18 ENCOUNTER — PATIENT MESSAGE (OUTPATIENT)
Dept: FAMILY MEDICINE CLINIC | Age: 44
End: 2024-03-18

## 2024-03-18 NOTE — TELEPHONE ENCOUNTER
From: Vashti Tierney  To: Dr. Adele Gambino  Sent: 3/18/2024 2:21 PM EDT  Subject: Family Health Update    HI Dr. Gambino!   I received news today that my maternal aunt was just diagnosed with Glioblastoma. Which reminded me that my maternal grandfather also had Glioblastoma in 2017. I tried updating my family health record in [employer] while I was thinking about this and it wouldn't let me. Is it possible to update on your end?  Thank you so much!   Vashti Tierney

## 2024-03-19 NOTE — TELEPHONE ENCOUNTER
Please update/add family history of glioblastoma (brain cancer) to her Family History in her record.

## 2024-08-12 ENCOUNTER — PATIENT MESSAGE (OUTPATIENT)
Dept: FAMILY MEDICINE CLINIC | Age: 44
End: 2024-08-12

## 2024-08-15 NOTE — TELEPHONE ENCOUNTER
Spoke with pt she has seen berenice in the past and is not understanding why she needs to do a NTP she has already switched providers on her  and it has been accepted. We did schedule one can that be switched to a physical?

## 2024-09-11 ENCOUNTER — OFFICE VISIT (OUTPATIENT)
Dept: FAMILY MEDICINE CLINIC | Age: 44
End: 2024-09-11
Payer: OTHER GOVERNMENT

## 2024-09-11 VITALS
DIASTOLIC BLOOD PRESSURE: 62 MMHG | BODY MASS INDEX: 22.96 KG/M2 | SYSTOLIC BLOOD PRESSURE: 124 MMHG | WEIGHT: 164 LBS | HEART RATE: 86 BPM | HEIGHT: 71 IN | OXYGEN SATURATION: 99 %

## 2024-09-11 DIAGNOSIS — N80.9 ENDOMETRIOSIS: ICD-10-CM

## 2024-09-11 DIAGNOSIS — R53.83 OTHER FATIGUE: ICD-10-CM

## 2024-09-11 DIAGNOSIS — Z76.89 ESTABLISHING CARE WITH NEW DOCTOR, ENCOUNTER FOR: ICD-10-CM

## 2024-09-11 DIAGNOSIS — Z76.89 ESTABLISHING CARE WITH NEW DOCTOR, ENCOUNTER FOR: Primary | ICD-10-CM

## 2024-09-11 DIAGNOSIS — Z00.00 WELL ADULT EXAM: ICD-10-CM

## 2024-09-11 DIAGNOSIS — Z13.29 SCREENING FOR THYROID DISORDER: ICD-10-CM

## 2024-09-11 DIAGNOSIS — N87.9 CERVICAL DYSPLASIA: ICD-10-CM

## 2024-09-11 DIAGNOSIS — Z00.00 PREVENTATIVE HEALTH CARE: ICD-10-CM

## 2024-09-11 DIAGNOSIS — Z13.220 SCREENING CHOLESTEROL LEVEL: ICD-10-CM

## 2024-09-11 LAB
25(OH)D3 SERPL-MCNC: 32.8 NG/ML
ALBUMIN SERPL-MCNC: 4 G/DL (ref 3.4–5)
ALBUMIN/GLOB SERPL: 1.6 {RATIO} (ref 1.1–2.2)
ALP SERPL-CCNC: 62 U/L (ref 40–129)
ALT SERPL-CCNC: 11 U/L (ref 10–40)
ANION GAP SERPL CALCULATED.3IONS-SCNC: 9 MMOL/L (ref 3–16)
AST SERPL-CCNC: 14 U/L (ref 15–37)
BASOPHILS # BLD: 0 K/UL (ref 0–0.2)
BASOPHILS NFR BLD: 0.8 %
BILIRUB SERPL-MCNC: 0.4 MG/DL (ref 0–1)
BUN SERPL-MCNC: 12 MG/DL (ref 7–20)
CALCIUM SERPL-MCNC: 8.8 MG/DL (ref 8.3–10.6)
CHLORIDE SERPL-SCNC: 105 MMOL/L (ref 99–110)
CHOLEST SERPL-MCNC: 203 MG/DL (ref 0–199)
CO2 SERPL-SCNC: 24 MMOL/L (ref 21–32)
CREAT SERPL-MCNC: 0.6 MG/DL (ref 0.6–1.1)
DEPRECATED RDW RBC AUTO: 12.7 % (ref 12.4–15.4)
EOSINOPHIL # BLD: 0.2 K/UL (ref 0–0.6)
EOSINOPHIL NFR BLD: 3.9 %
FOLATE SERPL-MCNC: 14.5 NG/ML (ref 4.78–24.2)
GFR SERPLBLD CREATININE-BSD FMLA CKD-EPI: >90 ML/MIN/{1.73_M2}
GLUCOSE SERPL-MCNC: 87 MG/DL (ref 70–99)
HCT VFR BLD AUTO: 35.6 % (ref 36–48)
HDLC SERPL-MCNC: 57 MG/DL (ref 40–60)
HGB BLD-MCNC: 12.1 G/DL (ref 12–16)
LDLC SERPL CALC-MCNC: 131 MG/DL
LYMPHOCYTES # BLD: 1.4 K/UL (ref 1–5.1)
LYMPHOCYTES NFR BLD: 31.7 %
MCH RBC QN AUTO: 31.1 PG (ref 26–34)
MCHC RBC AUTO-ENTMCNC: 34 G/DL (ref 31–36)
MCV RBC AUTO: 91.5 FL (ref 80–100)
MONOCYTES # BLD: 0.4 K/UL (ref 0–1.3)
MONOCYTES NFR BLD: 9.5 %
NEUTROPHILS # BLD: 2.5 K/UL (ref 1.7–7.7)
NEUTROPHILS NFR BLD: 54.1 %
PLATELET # BLD AUTO: 224 K/UL (ref 135–450)
PMV BLD AUTO: 9.5 FL (ref 5–10.5)
POTASSIUM SERPL-SCNC: 4.2 MMOL/L (ref 3.5–5.1)
PROT SERPL-MCNC: 6.5 G/DL (ref 6.4–8.2)
RBC # BLD AUTO: 3.89 M/UL (ref 4–5.2)
SODIUM SERPL-SCNC: 138 MMOL/L (ref 136–145)
T4 FREE SERPL-MCNC: 1.2 NG/DL (ref 0.9–1.8)
TRIGL SERPL-MCNC: 77 MG/DL (ref 0–150)
TSH SERPL DL<=0.005 MIU/L-ACNC: 1.23 UIU/ML (ref 0.27–4.2)
VIT B12 SERPL-MCNC: 398 PG/ML (ref 211–911)
VLDLC SERPL CALC-MCNC: 15 MG/DL
WBC # BLD AUTO: 4.5 K/UL (ref 4–11)

## 2024-09-11 PROCEDURE — 90471 IMMUNIZATION ADMIN: CPT | Performed by: NURSE PRACTITIONER

## 2024-09-11 PROCEDURE — 99396 PREV VISIT EST AGE 40-64: CPT | Performed by: NURSE PRACTITIONER

## 2024-09-11 PROCEDURE — 90715 TDAP VACCINE 7 YRS/> IM: CPT | Performed by: NURSE PRACTITIONER

## 2024-09-11 SDOH — HEALTH STABILITY: PHYSICAL HEALTH: ON AVERAGE, HOW MANY MINUTES DO YOU ENGAGE IN EXERCISE AT THIS LEVEL?: 30 MIN

## 2024-09-11 SDOH — HEALTH STABILITY: PHYSICAL HEALTH: ON AVERAGE, HOW MANY DAYS PER WEEK DO YOU ENGAGE IN MODERATE TO STRENUOUS EXERCISE (LIKE A BRISK WALK)?: 3 DAYS

## 2024-09-11 SDOH — ECONOMIC STABILITY: INCOME INSECURITY: HOW HARD IS IT FOR YOU TO PAY FOR THE VERY BASICS LIKE FOOD, HOUSING, MEDICAL CARE, AND HEATING?: NOT HARD AT ALL

## 2024-09-11 SDOH — ECONOMIC STABILITY: FOOD INSECURITY: WITHIN THE PAST 12 MONTHS, YOU WORRIED THAT YOUR FOOD WOULD RUN OUT BEFORE YOU GOT MONEY TO BUY MORE.: NEVER TRUE

## 2024-09-11 SDOH — ECONOMIC STABILITY: FOOD INSECURITY: WITHIN THE PAST 12 MONTHS, THE FOOD YOU BOUGHT JUST DIDN'T LAST AND YOU DIDN'T HAVE MONEY TO GET MORE.: NEVER TRUE

## 2024-09-11 ASSESSMENT — ENCOUNTER SYMPTOMS
BLOOD IN STOOL: 0
WHEEZING: 0
CHOKING: 0
EYE ITCHING: 0
RHINORRHEA: 0
SHORTNESS OF BREATH: 0
NAUSEA: 0
PHOTOPHOBIA: 0
ABDOMINAL PAIN: 0
COUGH: 0
SINUS PAIN: 0
VOICE CHANGE: 0
CONSTIPATION: 0
TROUBLE SWALLOWING: 0
EYE REDNESS: 0
EYE PAIN: 0
EYE DISCHARGE: 0
SORE THROAT: 0
STRIDOR: 0
SINUS PRESSURE: 0
VOMITING: 0
COLOR CHANGE: 0
BACK PAIN: 0
CHEST TIGHTNESS: 0
DIARRHEA: 0

## 2024-09-11 ASSESSMENT — PATIENT HEALTH QUESTIONNAIRE - PHQ9
2. FEELING DOWN, DEPRESSED OR HOPELESS: NOT AT ALL
SUM OF ALL RESPONSES TO PHQ QUESTIONS 1-9: 0
SUM OF ALL RESPONSES TO PHQ9 QUESTIONS 1 & 2: 0
SUM OF ALL RESPONSES TO PHQ QUESTIONS 1-9: 0
1. LITTLE INTEREST OR PLEASURE IN DOING THINGS: NOT AT ALL

## 2024-09-12 ENCOUNTER — PATIENT MESSAGE (OUTPATIENT)
Dept: FAMILY MEDICINE CLINIC | Age: 44
End: 2024-09-12

## 2024-09-12 DIAGNOSIS — Z12.31 ENCOUNTER FOR SCREENING MAMMOGRAM FOR MALIGNANT NEOPLASM OF BREAST: Primary | ICD-10-CM

## 2024-09-12 DIAGNOSIS — R92.30 DENSE BREASTS: ICD-10-CM

## 2024-09-12 DIAGNOSIS — M79.676 PAIN OF TOE, UNSPECIFIED LATERALITY: ICD-10-CM

## 2024-09-17 DIAGNOSIS — R79.89 ABNORMAL CBC: Primary | ICD-10-CM

## 2024-09-17 DIAGNOSIS — D64.9 LOW HEMOGLOBIN: ICD-10-CM

## 2024-09-25 ENCOUNTER — TELEPHONE (OUTPATIENT)
Dept: FAMILY MEDICINE CLINIC | Age: 44
End: 2024-09-25

## 2024-09-25 DIAGNOSIS — Z76.89 ESTABLISHING CARE WITH NEW DOCTOR, ENCOUNTER FOR: Primary | ICD-10-CM

## 2024-09-25 DIAGNOSIS — R79.89 ABNORMAL CBC: ICD-10-CM

## 2024-09-25 DIAGNOSIS — D64.9 LOW HEMOGLOBIN: ICD-10-CM

## 2024-09-25 DIAGNOSIS — Z00.00 WELL ADULT EXAM: ICD-10-CM

## 2024-09-25 DIAGNOSIS — Z00.00 PREVENTATIVE HEALTH CARE: ICD-10-CM

## 2024-09-25 LAB
BASOPHILS # BLD: 0.1 K/UL (ref 0–0.2)
BASOPHILS NFR BLD: 1.4 %
DEPRECATED RDW RBC AUTO: 13 % (ref 12.4–15.4)
EOSINOPHIL # BLD: 0.3 K/UL (ref 0–0.6)
EOSINOPHIL NFR BLD: 5.9 %
FERRITIN SERPL IA-MCNC: 25.5 NG/ML (ref 15–150)
HCT VFR BLD AUTO: 38 % (ref 36–48)
HGB BLD-MCNC: 12.9 G/DL (ref 12–16)
IRON SATN MFR SERPL: 22 % (ref 15–50)
IRON SERPL-MCNC: 73 UG/DL (ref 37–145)
LYMPHOCYTES # BLD: 1.9 K/UL (ref 1–5.1)
LYMPHOCYTES NFR BLD: 37.9 %
MCH RBC QN AUTO: 31.2 PG (ref 26–34)
MCHC RBC AUTO-ENTMCNC: 33.9 G/DL (ref 31–36)
MCV RBC AUTO: 91.9 FL (ref 80–100)
MONOCYTES # BLD: 0.5 K/UL (ref 0–1.3)
MONOCYTES NFR BLD: 9.9 %
NEUTROPHILS # BLD: 2.3 K/UL (ref 1.7–7.7)
NEUTROPHILS NFR BLD: 44.9 %
PLATELET # BLD AUTO: 303 K/UL (ref 135–450)
PMV BLD AUTO: 8.7 FL (ref 5–10.5)
RBC # BLD AUTO: 4.13 M/UL (ref 4–5.2)
TIBC SERPL-MCNC: 336 UG/DL (ref 260–445)
WBC # BLD AUTO: 5 K/UL (ref 4–11)

## 2024-10-01 DIAGNOSIS — Z00.00 WELL ADULT EXAM: Primary | ICD-10-CM

## 2024-10-05 ENCOUNTER — PATIENT MESSAGE (OUTPATIENT)
Dept: FAMILY MEDICINE CLINIC | Age: 44
End: 2024-10-05

## 2024-12-02 ENCOUNTER — TELEPHONE (OUTPATIENT)
Dept: ENT CLINIC | Age: 44
End: 2024-12-02

## 2024-12-02 NOTE — TELEPHONE ENCOUNTER
Patient has appointment request with Dr. Hollins. [facility] for patient to call back and schedule. 12/2/24

## 2024-12-11 ENCOUNTER — TELEPHONE (OUTPATIENT)
Dept: ENT CLINIC | Age: 44
End: 2024-12-11

## 2024-12-11 NOTE — TELEPHONE ENCOUNTER
Patient has an appointment request with Dr. Hollins. [facility] for patient to call back and schedule. 12/11/24

## 2025-01-02 ASSESSMENT — ENCOUNTER SYMPTOMS
RHINORRHEA: 0
SHORTNESS OF BREATH: 0
SINUS PAIN: 1
SINUS PRESSURE: 1
EYE PAIN: 0
COUGH: 0
NAUSEA: 0
VOMITING: 0

## 2025-01-02 NOTE — PROGRESS NOTES
[facility]  DIVISION OF OTOLARYNGOLOGY- HEAD & NECK SURGERY  CONSULT    Patient Name: Vashti Tierney  Medical Record Number:  [id number]  Primary Care Physician:  Jessica López APRN - CNP  Date of Consultation: 1/3/2025    Chief Complaint:   Chief Complaint   Patient presents with    Follow-up     Sinus follow up, Sinus CT 1/11/2024 in  epic        HISTORY OF PRESENT ILLNESS  Vashti is a(n) 44 y.o. female who presents for evaluation of nasal congestion, recurrent sinus infections, and seasonal allergies.  She states that she gets 3-4 sinus infections per year with her last infection being in November requiring antibiotics.  In December she then got either the flu or COVID.  Today she is feeling pretty well.  She takes her Zyrtec and Flonase on a daily basis.  She has never had any nose or sinus surgeries.  She always have difficulty breathing out of the right side of her nose and sometimes the left as well.    Interval History 01/03/25  Nathaly states that she has been taking her Astelin Zyrtec and Flonase on a regular basis and still having lots of nasal congestion.  She had allergy testing approximately 10 years ago but is interested in repeat allergy testing.    Patient Active Problem List   Diagnosis    Adnexal mass    Abnormal uterine bleeding (AUB)    Dysmenorrhea    Menorrhagia with irregular cycle    Encounter for genetic counseling and testing    Other noninflammatory disorders of ovary, fallopian tube and broad ligament    Cervical dysplasia    Endometriosis     Past Surgical History:   Procedure Laterality Date    LAPAROSCOPY      LEEP       Family History   Problem Relation Age of Onset    Seizures Mother     Cancer Maternal Grandfather         Prostate    High Blood Pressure Maternal Grandfather     Colon Cancer Maternal Grandfather     Brain Cancer Maternal Grandfather     Ovarian Cancer Maternal Aunt 64    Brain Cancer Maternal Aunt      Social History     Socioeconomic History    Marital status:

## 2025-01-03 ENCOUNTER — OFFICE VISIT (OUTPATIENT)
Dept: ENT CLINIC | Age: 45
End: 2025-01-03

## 2025-01-03 VITALS
BODY MASS INDEX: 22.96 KG/M2 | SYSTOLIC BLOOD PRESSURE: 109 MMHG | HEIGHT: 71 IN | DIASTOLIC BLOOD PRESSURE: 74 MMHG | HEART RATE: 69 BPM | WEIGHT: 164 LBS

## 2025-01-03 DIAGNOSIS — J34.2 DEVIATED NASAL SEPTUM: ICD-10-CM

## 2025-01-03 DIAGNOSIS — J30.2 SEASONAL ALLERGIES: Primary | ICD-10-CM

## 2025-03-10 ENCOUNTER — TELEMEDICINE (OUTPATIENT)
Dept: FAMILY MEDICINE CLINIC | Age: 45
End: 2025-03-10
Payer: OTHER GOVERNMENT

## 2025-03-10 DIAGNOSIS — N64.4 BREAST PAIN, RIGHT: Primary | ICD-10-CM

## 2025-03-10 DIAGNOSIS — N64.4 NIPPLE PAIN: ICD-10-CM

## 2025-03-10 PROCEDURE — 99213 OFFICE O/P EST LOW 20 MIN: CPT | Performed by: NURSE PRACTITIONER

## 2025-03-10 ASSESSMENT — ENCOUNTER SYMPTOMS
VOMITING: 0
ABDOMINAL PAIN: 0
NAUSEA: 0
BLOOD IN STOOL: 0
STRIDOR: 0
EYE PAIN: 0
COUGH: 0
RHINORRHEA: 0
CONSTIPATION: 0
VOICE CHANGE: 0
PHOTOPHOBIA: 0
EYE ITCHING: 0
SINUS PRESSURE: 0
SORE THROAT: 0
COLOR CHANGE: 0
WHEEZING: 0
BACK PAIN: 0
CHOKING: 0
SINUS PAIN: 0
SHORTNESS OF BREATH: 0
CHEST TIGHTNESS: 0
EYE REDNESS: 0
TROUBLE SWALLOWING: 0
EYE DISCHARGE: 0
DIARRHEA: 0

## 2025-03-10 NOTE — PROGRESS NOTES
Vashti Tierney, was evaluated through a synchronous (real-time) audio-video encounter. The patient (or guardian if applicable) is aware that this is a billable service, which includes applicable co-pays. This Virtual Visit was conducted with patient's (and/or legal guardian's) consent. Patient identification was verified, and a caregiver was present when appropriate.   The patient was located at Home: [address] [address]  Provider was located at Facility (Appt Dept): [address] [place], [place] [id number]  Confirm you are appropriately licensed, registered, or certified to deliver care in the state where the patient is located as indicated above. If you are not or unsure, please re-schedule the visit: Yes, I confirm.     Vashti Tierney (:  1980) is a Established patient, presenting virtually for evaluation of the following:      Below is the assessment and plan developed based on review of pertinent history, physical exam, labs, studies, and medications.     Assessment & Plan  Breast pain, right   New, uncertain prognosis, get imaging     Orders:    [facility] DIGITAL DIAGNOSTIC BILATERAL; Future    US BREAST COMPLETE RIGHT; Future    Nipple pain       Orders:    [facility] DIGITAL DIAGNOSTIC BILATERAL; Future    US BREAST COMPLETE RIGHT; Future      No follow-ups on file.       Subjective   HPI    Right Nipple tenderness: Started period on Friday last week, Saturday sharp pains in right breast, if would turn to lay on side, had shooting pain, significant whole period, hurt with hugs during whole period. Has not had this prior. Off of her period since Tuesday and pain is still there. Last mammogram was October, no drainage or dimpling.     Educated on symptoms of shingles to look out for as well. Pt will schedule and let me know about imaging. Will consider breast specialist referral.       Review of Systems   Constitutional:  Negative for activity change, appetite change,

## 2025-03-20 ENCOUNTER — HOSPITAL ENCOUNTER (OUTPATIENT)
Dept: WOMENS IMAGING | Age: 45
Discharge: HOME | End: 2025-03-20
Payer: OTHER GOVERNMENT

## 2025-03-20 ENCOUNTER — RESULTS FOLLOW-UP (OUTPATIENT)
Dept: WOMENS IMAGING | Age: 45
End: 2025-03-20

## 2025-03-20 ENCOUNTER — HOSPITAL ENCOUNTER (OUTPATIENT)
Dept: WOMENS IMAGING | Age: 45
End: 2025-03-20
Payer: OTHER GOVERNMENT

## 2025-03-20 VITALS — WEIGHT: 155 LBS | HEIGHT: 71 IN | BODY MASS INDEX: 21.7 KG/M2

## 2025-03-20 DIAGNOSIS — N64.4 BREAST PAIN, RIGHT: Primary | ICD-10-CM

## 2025-03-20 DIAGNOSIS — N64.4 NIPPLE PAIN: ICD-10-CM

## 2025-03-20 DIAGNOSIS — N64.4 BREAST PAIN, RIGHT: ICD-10-CM

## 2025-03-20 PROCEDURE — G0279 TOMOSYNTHESIS, MAMMO: HCPCS

## 2025-06-04 ENCOUNTER — OFFICE VISIT (OUTPATIENT)
Dept: FAMILY MEDICINE CLINIC | Age: 45
End: 2025-06-04
Payer: OTHER GOVERNMENT

## 2025-06-04 VITALS
OXYGEN SATURATION: 96 % | SYSTOLIC BLOOD PRESSURE: 106 MMHG | BODY MASS INDEX: 23.38 KG/M2 | HEART RATE: 65 BPM | WEIGHT: 167 LBS | HEIGHT: 71 IN | DIASTOLIC BLOOD PRESSURE: 68 MMHG

## 2025-06-04 DIAGNOSIS — L98.9 SKIN LESION OF BACK: Primary | ICD-10-CM

## 2025-06-04 PROCEDURE — 99213 OFFICE O/P EST LOW 20 MIN: CPT | Performed by: NURSE PRACTITIONER

## 2025-06-04 SDOH — ECONOMIC STABILITY: FOOD INSECURITY: WITHIN THE PAST 12 MONTHS, THE FOOD YOU BOUGHT JUST DIDN'T LAST AND YOU DIDN'T HAVE MONEY TO GET MORE.: NEVER TRUE

## 2025-06-04 SDOH — ECONOMIC STABILITY: FOOD INSECURITY: WITHIN THE PAST 12 MONTHS, YOU WORRIED THAT YOUR FOOD WOULD RUN OUT BEFORE YOU GOT MONEY TO BUY MORE.: NEVER TRUE

## 2025-06-04 ASSESSMENT — PATIENT HEALTH QUESTIONNAIRE - PHQ9
5. POOR APPETITE OR OVEREATING: NOT AT ALL
1. LITTLE INTEREST OR PLEASURE IN DOING THINGS: SEVERAL DAYS
6. FEELING BAD ABOUT YOURSELF - OR THAT YOU ARE A FAILURE OR HAVE LET YOURSELF OR YOUR FAMILY DOWN: NOT AT ALL
SUM OF ALL RESPONSES TO PHQ QUESTIONS 1-9: 4
SUM OF ALL RESPONSES TO PHQ QUESTIONS 1-9: 4
4. FEELING TIRED OR HAVING LITTLE ENERGY: MORE THAN HALF THE DAYS
7. TROUBLE CONCENTRATING ON THINGS, SUCH AS READING THE NEWSPAPER OR WATCHING TELEVISION: NOT AT ALL
SUM OF ALL RESPONSES TO PHQ QUESTIONS 1-9: 4
10. IF YOU CHECKED OFF ANY PROBLEMS, HOW DIFFICULT HAVE THESE PROBLEMS MADE IT FOR YOU TO DO YOUR WORK, TAKE CARE OF THINGS AT HOME, OR GET ALONG WITH OTHER PEOPLE: NOT DIFFICULT AT ALL
3. TROUBLE FALLING OR STAYING ASLEEP: NOT AT ALL
2. FEELING DOWN, DEPRESSED OR HOPELESS: SEVERAL DAYS
SUM OF ALL RESPONSES TO PHQ QUESTIONS 1-9: 4
9. THOUGHTS THAT YOU WOULD BE BETTER OFF DEAD, OR OF HURTING YOURSELF: NOT AT ALL
8. MOVING OR SPEAKING SO SLOWLY THAT OTHER PEOPLE COULD HAVE NOTICED. OR THE OPPOSITE, BEING SO FIGETY OR RESTLESS THAT YOU HAVE BEEN MOVING AROUND A LOT MORE THAN USUAL: NOT AT ALL

## 2025-06-04 ASSESSMENT — ENCOUNTER SYMPTOMS
EYE DISCHARGE: 0
CHOKING: 0
SHORTNESS OF BREATH: 0
SINUS PRESSURE: 0
COUGH: 0
SINUS PAIN: 0
DIARRHEA: 0
COLOR CHANGE: 0
VOICE CHANGE: 0
BLOOD IN STOOL: 0
CONSTIPATION: 0
WHEEZING: 0
EYE ITCHING: 0
SORE THROAT: 0
VOMITING: 0
CHEST TIGHTNESS: 0
STRIDOR: 0
TROUBLE SWALLOWING: 0
EYE PAIN: 0
EYE REDNESS: 0
PHOTOPHOBIA: 0
BACK PAIN: 0
ABDOMINAL PAIN: 0
RHINORRHEA: 0
NAUSEA: 0

## 2025-06-04 NOTE — PROGRESS NOTES
Chief Complaint   Patient presents with    Mass     Left side  Noticed on Saturday  Side/back has been hurting for months       /68   Pulse 65   Ht 1.803 m (5' 11\")   Wt 75.8 kg (167 lb)   SpO2 96%   BMI 23.29 kg/m²     HPI:  Vashti Tierney is a 45 y.o. (: 1980) here today   for   HPI    Patient's medications, allergies, past medical, surgical, social and family histories were reviewed and updated as appropriate.    Skin lesion: friend found saturday, states left sie of body holds her stress, that portion of back hurting for months, has tried massages, if leans left and feel pressure and compression, feels smaller today, still hurts to touch,  can't see from skin, pain only with touch,  no reactive lymph that she has seen,       ROS:  Review of Systems   Constitutional:  Negative for activity change, appetite change, chills, diaphoresis, fatigue, fever and unexpected weight change.   HENT:  Negative for congestion, ear discharge, ear pain, hearing loss, nosebleeds, postnasal drip, rhinorrhea, sinus pressure, sinus pain, sneezing, sore throat, tinnitus, trouble swallowing and voice change.    Eyes:  Negative for photophobia, pain, discharge, redness and itching.   Respiratory:  Negative for cough, choking, chest tightness, shortness of breath, wheezing and stridor.    Cardiovascular:  Negative for chest pain, palpitations and leg swelling.   Gastrointestinal:  Negative for abdominal pain, blood in stool, constipation, diarrhea, nausea and vomiting.   Endocrine: Negative for cold intolerance, heat intolerance, polydipsia and polyuria.   Genitourinary:  Negative for difficulty urinating, dysuria, enuresis, flank pain, frequency, hematuria and urgency.   Musculoskeletal:  Negative for back pain, gait problem, joint swelling, neck pain and neck stiffness.   Skin:  Negative for color change, pallor, rash and wound.        Left rib/chest wall/flank density   Allergic/Immunologic: Negative for

## 2025-06-05 ENCOUNTER — HOSPITAL ENCOUNTER (OUTPATIENT)
Dept: ULTRASOUND IMAGING | Age: 45
Discharge: HOME | End: 2025-06-05
Payer: OTHER GOVERNMENT

## 2025-06-05 ENCOUNTER — RESULTS FOLLOW-UP (OUTPATIENT)
Dept: FAMILY MEDICINE CLINIC | Age: 45
End: 2025-06-05

## 2025-06-05 DIAGNOSIS — L98.9 SKIN LESION OF BACK: ICD-10-CM

## 2025-06-05 PROCEDURE — 76999 ECHO EXAMINATION PROCEDURE: CPT

## 2025-06-10 ENCOUNTER — OFFICE VISIT (OUTPATIENT)
Dept: FAMILY MEDICINE CLINIC | Age: 45
End: 2025-06-10
Payer: OTHER GOVERNMENT

## 2025-06-10 VITALS
WEIGHT: 157 LBS | OXYGEN SATURATION: 97 % | BODY MASS INDEX: 21.98 KG/M2 | HEIGHT: 71 IN | SYSTOLIC BLOOD PRESSURE: 108 MMHG | HEART RATE: 79 BPM | DIASTOLIC BLOOD PRESSURE: 71 MMHG

## 2025-06-10 DIAGNOSIS — Z01.419 WELL WOMAN EXAM: Primary | ICD-10-CM

## 2025-06-10 DIAGNOSIS — N92.0 MENORRHAGIA WITH REGULAR CYCLE: ICD-10-CM

## 2025-06-10 DIAGNOSIS — Z12.11 COLON CANCER SCREENING: ICD-10-CM

## 2025-06-10 PROCEDURE — S0610 ANNUAL GYNECOLOGICAL EXAMINA: HCPCS | Performed by: NURSE PRACTITIONER

## 2025-06-10 NOTE — PROGRESS NOTES
Annual GYN Visit      Vashti Tierney  YOB: 1980    Date of Service:  6/10/2025    Chief Complaint:   Vashti Tierney is a 45 y.o. female who presents for routine annual gynecologic visit.    HPI:  Patient is premenopausal- No LMP recorded..  She denies menopause symptoms, first few days of period, heavy, bloated, fatigue.  Menopausal/perimenopausal symptoms: none.  Hormone therapy side effects: none.    Maternal grandfather had colon cancer.  Will order cologuard.     Allergies   Allergen Reactions    Penicillins Anxiety, Nausea And Vomiting and Palpitations       Outpatient Medications Marked as Taking for the 6/10/25 encounter (Office Visit) with Jessica López APRN - CNP   Medication Sig Dispense Refill    azelastine (ASTELIN) 0.1 % nasal spray 1 spray by Nasal route 2 times daily Use in each nostril as directed 30 mL 3    cetirizine (ZYRTEC) 10 MG tablet TAKE ONE TABLET BY MOUTH DAILY 90 tablet 3       Preventive Care and Risk Factor Assessment  Health Maintenance   Topic Date Due    Colorectal Cancer Screen  Never done    Cervical cancer screen  06/17/2025    Hepatitis B vaccine (1 of 3 - 19+ 3-dose series) 09/11/2025 (Originally 2/12/1999)    COVID-19 Vaccine (1 - 2024-25 season) 06/10/2026 (Originally 9/1/2024)    Flu vaccine (Season Ended) 08/01/2025    Depression Screen  06/04/2026    Breast cancer screen  03/20/2027    Lipids  09/11/2029    DTaP/Tdap/Td vaccine (2 - Td or Tdap) 09/11/2034    Hepatitis A vaccine  Aged Out    Hib vaccine  Aged Out    HPV vaccine  Aged Out    Polio vaccine  Aged Out    Meningococcal (ACWY) vaccine  Aged Out    Meningococcal B vaccine  Aged Out    Pneumococcal 0-49 years Vaccine  Aged Out    Varicella vaccine  Discontinued    Depression Monitoring  Discontinued    Hepatitis C screen  Discontinued    HIV screen  Discontinued          Hx abnormal PAP: yes, was 20 years ago  Sexual activity: single partner, contraception - vasectomy.  Hx of STD: no  Hx of

## 2025-06-11 LAB
HPV HR 12 DNA SPEC QL NAA+PROBE: NOT DETECTED
HPV16 DNA SPEC QL NAA+PROBE: NOT DETECTED
HPV16+18+H RISK 12 DNA SPEC-IMP: NORMAL
HPV18 DNA SPEC QL NAA+PROBE: NOT DETECTED

## 2025-06-23 ENCOUNTER — RESULTS FOLLOW-UP (OUTPATIENT)
Dept: FAMILY MEDICINE CLINIC | Age: 45
End: 2025-06-23

## 2025-07-14 LAB — NONINV COLON CA DNA+OCC BLD SCRN STL QL: NEGATIVE
